# Patient Record
Sex: MALE | Race: WHITE | NOT HISPANIC OR LATINO | Employment: PART TIME | URBAN - METROPOLITAN AREA
[De-identification: names, ages, dates, MRNs, and addresses within clinical notes are randomized per-mention and may not be internally consistent; named-entity substitution may affect disease eponyms.]

---

## 2024-05-22 ENCOUNTER — HOSPITAL ENCOUNTER (OUTPATIENT)
Facility: HOSPITAL | Age: 29
Discharge: HOME | End: 2024-05-23
Attending: EMERGENCY MEDICINE | Admitting: STUDENT IN AN ORGANIZED HEALTH CARE EDUCATION/TRAINING PROGRAM
Payer: COMMERCIAL

## 2024-05-22 DIAGNOSIS — L02.31 GLUTEAL ABSCESS: Primary | ICD-10-CM

## 2024-05-22 DIAGNOSIS — L02.215 PERINEAL ABSCESS: ICD-10-CM

## 2024-05-22 LAB
ALBUMIN SERPL-MCNC: 4.1 G/DL (ref 3.5–5.2)
ALBUMIN/GLOB SERPL: 1.2 G/DL
ALP SERPL-CCNC: 57 U/L (ref 39–117)
ALT SERPL W P-5'-P-CCNC: 35 U/L (ref 1–41)
ANION GAP SERPL CALCULATED.3IONS-SCNC: 10 MMOL/L (ref 5–15)
AST SERPL-CCNC: 24 U/L (ref 1–40)
BASOPHILS # BLD AUTO: 0.03 10*3/MM3 (ref 0–0.2)
BASOPHILS NFR BLD AUTO: 0.3 % (ref 0–1.5)
BILIRUB SERPL-MCNC: 0.6 MG/DL (ref 0–1.2)
BUN SERPL-MCNC: 11 MG/DL (ref 6–20)
BUN/CREAT SERPL: 9.6 (ref 7–25)
CALCIUM SPEC-SCNC: 9.6 MG/DL (ref 8.6–10.5)
CHLORIDE SERPL-SCNC: 99 MMOL/L (ref 98–107)
CO2 SERPL-SCNC: 26 MMOL/L (ref 22–29)
CREAT SERPL-MCNC: 1.14 MG/DL (ref 0.76–1.27)
DEPRECATED RDW RBC AUTO: 40.9 FL (ref 37–54)
EGFRCR SERPLBLD CKD-EPI 2021: 91 ML/MIN/1.73
EOSINOPHIL # BLD AUTO: 0.11 10*3/MM3 (ref 0–0.4)
EOSINOPHIL NFR BLD AUTO: 0.9 % (ref 0.3–6.2)
ERYTHROCYTE [DISTWIDTH] IN BLOOD BY AUTOMATED COUNT: 12.4 % (ref 12.3–15.4)
GLOBULIN UR ELPH-MCNC: 3.4 GM/DL
GLUCOSE SERPL-MCNC: 99 MG/DL (ref 65–99)
HCT VFR BLD AUTO: 43.4 % (ref 37.5–51)
HGB BLD-MCNC: 14.4 G/DL (ref 13–17.7)
IMM GRANULOCYTES # BLD AUTO: 0.01 10*3/MM3 (ref 0–0.05)
IMM GRANULOCYTES NFR BLD AUTO: 0.1 % (ref 0–0.5)
LYMPHOCYTES # BLD AUTO: 1.88 10*3/MM3 (ref 0.7–3.1)
LYMPHOCYTES NFR BLD AUTO: 15.7 % (ref 19.6–45.3)
MCH RBC QN AUTO: 29.1 PG (ref 26.6–33)
MCHC RBC AUTO-ENTMCNC: 33.2 G/DL (ref 31.5–35.7)
MCV RBC AUTO: 87.9 FL (ref 79–97)
MONOCYTES # BLD AUTO: 1.25 10*3/MM3 (ref 0.1–0.9)
MONOCYTES NFR BLD AUTO: 10.4 % (ref 5–12)
NEUTROPHILS NFR BLD AUTO: 72.6 % (ref 42.7–76)
NEUTROPHILS NFR BLD AUTO: 8.72 10*3/MM3 (ref 1.7–7)
PLATELET # BLD AUTO: 248 10*3/MM3 (ref 140–450)
PMV BLD AUTO: 8.1 FL (ref 6–12)
POTASSIUM SERPL-SCNC: 3.9 MMOL/L (ref 3.5–5.2)
PROT SERPL-MCNC: 7.5 G/DL (ref 6–8.5)
RBC # BLD AUTO: 4.94 10*6/MM3 (ref 4.14–5.8)
SODIUM SERPL-SCNC: 135 MMOL/L (ref 136–145)
WBC NRBC COR # BLD AUTO: 12 10*3/MM3 (ref 3.4–10.8)

## 2024-05-22 PROCEDURE — 80053 COMPREHEN METABOLIC PANEL: CPT | Performed by: NURSE PRACTITIONER

## 2024-05-22 PROCEDURE — 85025 COMPLETE CBC W/AUTO DIFF WBC: CPT | Performed by: NURSE PRACTITIONER

## 2024-05-22 PROCEDURE — 99284 EMERGENCY DEPT VISIT MOD MDM: CPT | Performed by: NURSE PRACTITIONER

## 2024-05-22 PROCEDURE — G0378 HOSPITAL OBSERVATION PER HR: HCPCS

## 2024-05-22 PROCEDURE — 99285 EMERGENCY DEPT VISIT HI MDM: CPT

## 2024-05-22 PROCEDURE — 10081 I&D PILONIDAL CYST COMP: CPT | Performed by: NURSE PRACTITIONER

## 2024-05-22 RX ORDER — NITROGLYCERIN 0.4 MG/1
0.4 TABLET SUBLINGUAL
Status: DISCONTINUED | OUTPATIENT
Start: 2024-05-22 | End: 2024-05-23 | Stop reason: HOSPADM

## 2024-05-22 RX ORDER — SODIUM CHLORIDE 0.9 % (FLUSH) 0.9 %
10 SYRINGE (ML) INJECTION EVERY 12 HOURS SCHEDULED
Status: DISCONTINUED | OUTPATIENT
Start: 2024-05-23 | End: 2024-05-23 | Stop reason: HOSPADM

## 2024-05-22 RX ORDER — LIDOCAINE HYDROCHLORIDE AND EPINEPHRINE 10; 10 MG/ML; UG/ML
10 INJECTION, SOLUTION INFILTRATION; PERINEURAL ONCE
Status: COMPLETED | OUTPATIENT
Start: 2024-05-22 | End: 2024-05-22

## 2024-05-22 RX ORDER — VANCOMYCIN/0.9 % SOD CHLORIDE 1.5G/250ML
1500 PLASTIC BAG, INJECTION (ML) INTRAVENOUS EVERY 12 HOURS
Status: DISCONTINUED | OUTPATIENT
Start: 2024-05-23 | End: 2024-05-23 | Stop reason: HOSPADM

## 2024-05-22 RX ORDER — ACETAMINOPHEN 160 MG/5ML
650 SOLUTION ORAL EVERY 4 HOURS PRN
Status: DISCONTINUED | OUTPATIENT
Start: 2024-05-22 | End: 2024-05-23 | Stop reason: HOSPADM

## 2024-05-22 RX ORDER — SODIUM CHLORIDE 9 MG/ML
40 INJECTION, SOLUTION INTRAVENOUS AS NEEDED
Status: DISCONTINUED | OUTPATIENT
Start: 2024-05-22 | End: 2024-05-23 | Stop reason: HOSPADM

## 2024-05-22 RX ORDER — UREA 10 %
5 LOTION (ML) TOPICAL NIGHTLY PRN
Status: DISCONTINUED | OUTPATIENT
Start: 2024-05-22 | End: 2024-05-23 | Stop reason: HOSPADM

## 2024-05-22 RX ORDER — SODIUM CHLORIDE 0.9 % (FLUSH) 0.9 %
10 SYRINGE (ML) INJECTION AS NEEDED
Status: DISCONTINUED | OUTPATIENT
Start: 2024-05-22 | End: 2024-05-23 | Stop reason: HOSPADM

## 2024-05-22 RX ORDER — ONDANSETRON 2 MG/ML
4 INJECTION INTRAMUSCULAR; INTRAVENOUS EVERY 6 HOURS PRN
Status: DISCONTINUED | OUTPATIENT
Start: 2024-05-22 | End: 2024-05-23 | Stop reason: HOSPADM

## 2024-05-22 RX ORDER — BISACODYL 5 MG/1
5 TABLET, DELAYED RELEASE ORAL DAILY PRN
Status: DISCONTINUED | OUTPATIENT
Start: 2024-05-22 | End: 2024-05-23 | Stop reason: HOSPADM

## 2024-05-22 RX ORDER — AMOXICILLIN 250 MG
2 CAPSULE ORAL 2 TIMES DAILY PRN
Status: DISCONTINUED | OUTPATIENT
Start: 2024-05-22 | End: 2024-05-23 | Stop reason: HOSPADM

## 2024-05-22 RX ORDER — BISACODYL 10 MG
10 SUPPOSITORY, RECTAL RECTAL DAILY PRN
Status: DISCONTINUED | OUTPATIENT
Start: 2024-05-22 | End: 2024-05-23 | Stop reason: HOSPADM

## 2024-05-22 RX ORDER — VANCOMYCIN 2 GRAM/500 ML IN 0.9 % SODIUM CHLORIDE INTRAVENOUS
2000 ONCE
Status: COMPLETED | OUTPATIENT
Start: 2024-05-23 | End: 2024-05-23

## 2024-05-22 RX ORDER — ACETAMINOPHEN 650 MG/1
650 SUPPOSITORY RECTAL EVERY 4 HOURS PRN
Status: DISCONTINUED | OUTPATIENT
Start: 2024-05-22 | End: 2024-05-23 | Stop reason: HOSPADM

## 2024-05-22 RX ORDER — ONDANSETRON 4 MG/1
4 TABLET, ORALLY DISINTEGRATING ORAL EVERY 6 HOURS PRN
Status: DISCONTINUED | OUTPATIENT
Start: 2024-05-22 | End: 2024-05-23 | Stop reason: HOSPADM

## 2024-05-22 RX ORDER — ACETAMINOPHEN 325 MG/1
650 TABLET ORAL EVERY 4 HOURS PRN
Status: DISCONTINUED | OUTPATIENT
Start: 2024-05-22 | End: 2024-05-23 | Stop reason: HOSPADM

## 2024-05-22 RX ORDER — POLYETHYLENE GLYCOL 3350 17 G/17G
17 POWDER, FOR SOLUTION ORAL DAILY PRN
Status: DISCONTINUED | OUTPATIENT
Start: 2024-05-22 | End: 2024-05-23 | Stop reason: HOSPADM

## 2024-05-22 RX ADMIN — Medication 2 ML: at 18:36

## 2024-05-22 RX ADMIN — LIDOCAINE HYDROCHLORIDE AND EPINEPHRINE 10 ML: 10; 10 INJECTION, SOLUTION INFILTRATION; PERINEURAL at 19:09

## 2024-05-22 NOTE — FSED PROVIDER NOTE
EMERGENCY DEPARTMENT ENCOUNTER    Room Number:  10/10  Date seen:  5/22/2024  Time seen: 18:24 EDT  PCP: Provider, No Known  Historian: patient    Discussed/obtained information from independent historians: n/a    HPI:  Chief complaint:buttocks abscess  A complete HPI/ROS/PMH/PSH/SH/FH are unobtainable due to:   Context:Freddy Long is a 28 y.o. male who presents to the ED with c/o acute increase in pain and swelling to right medial gluteal abscess.  He was seen here 2 days ago for similar and had small incision and drainage, placed on Bactrim DS and encouraged to follow up General Surgery.  He has not made that appointment yet.  Denies fever/chills or problems passing bowel movements. Has had this problem before. He does have h/o pilonidal cyst drainage in 2019 that improved with general surgery incision and drainage.     ALLERGIES  Patient has no known allergies.    PAST MEDICAL HISTORY  Active Ambulatory Problems     Diagnosis Date Noted    No Active Ambulatory Problems     Resolved Ambulatory Problems     Diagnosis Date Noted    No Resolved Ambulatory Problems     No Additional Past Medical History       PAST SURGICAL HISTORY  History reviewed. No pertinent surgical history.    FAMILY HISTORY  History reviewed. No pertinent family history.    SOCIAL HISTORY  Social History     Socioeconomic History    Marital status:        REVIEW OF SYSTEMS  Review of Systems    All systems reviewed and negative except for those discussed in HPI.     PHYSICAL EXAM    I have reviewed the triage vital signs and nursing notes.  Vitals:    05/22/24 1819   BP: 150/82   Pulse: 83   Resp: 16   Temp: 99.1 °F (37.3 °C)   SpO2: 96%     Physical Exam  Constitutional:           Comments: Right medial gluteal abscess. Moderate fluctuance noted. I can see where the prior incision and drainage was performed.          GENERAL: not distressed  HENT: nares patent  EYES: no scleral icterus  NECK: no ROM limitations  CV: regular  rhythm, regular rate  RESPIRATORY: normal effort  ABDOMEN: soft  : see area above for gluteal abscess.  It does not involve the rectum.   MUSCULOSKELETAL: no deformity  NEURO: alert, moves all extremities, follows commands  SKIN: warm, dry  Incision & Drainage    Date/Time: 5/22/2024 7:10 PM    Performed by: Teresa Kramer APRN  Authorized by: Emigdio Jacobsen MD    Consent:     Consent obtained:  Verbal    Consent given by:  Patient    Risks, benefits, and alternatives were discussed: yes      Risks discussed:  Bleeding, incomplete drainage, pain and infection    Alternatives discussed:  No treatment  Universal protocol:     Patient identity confirmed:  Verbally with patient and arm band  Location:     Type:  Abscess    Location:  Anogenital    Anogenital location:  Pilonidal  Pre-procedure details:     Skin preparation:  Chlorhexidine  Sedation:     Sedation type:  None  Anesthesia:     Anesthesia method:  Topical application and local infiltration    Topical anesthetic:  LET    Local anesthetic:  Lidocaine 1% WITH epi  Procedure type:     Complexity:  Complex  Procedure details:     Ultrasound guidance: no      Needle aspiration: no      Incision types:  Stab incision    Incision depth:  Subcutaneous    Wound management:  Probed and deloculated and irrigated with saline    Drainage:  Bloody and purulent    Drainage amount:  Moderate    Wound treatment:  Wound left open    Packing materials:  None  Comments:      Probed to break up loculations,  the drainage was only effective if I pushed the upper most fluctuant area.  Pt seen here 2 days ago for similar and the abscess is much worse and more swollen.  He has low grade feer.     LAB RESULTS  Recent Results (from the past 24 hour(s))   CBC Auto Differential    Collection Time: 05/22/24  7:53 PM    Specimen: Blood   Result Value Ref Range    WBC 12.00 (H) 3.40 - 10.80 10*3/mm3    RBC 4.94 4.14 - 5.80 10*6/mm3    Hemoglobin 14.4 13.0 - 17.7 g/dL    Hematocrit  43.4 37.5 - 51.0 %    MCV 87.9 79.0 - 97.0 fL    MCH 29.1 26.6 - 33.0 pg    MCHC 33.2 31.5 - 35.7 g/dL    RDW 12.4 12.3 - 15.4 %    RDW-SD 40.9 37.0 - 54.0 fl    MPV 8.1 6.0 - 12.0 fL    Platelets 248 140 - 450 10*3/mm3    Neutrophil % 72.6 42.7 - 76.0 %    Lymphocyte % 15.7 (L) 19.6 - 45.3 %    Monocyte % 10.4 5.0 - 12.0 %    Eosinophil % 0.9 0.3 - 6.2 %    Basophil % 0.3 0.0 - 1.5 %    Immature Grans % 0.1 0.0 - 0.5 %    Neutrophils, Absolute 8.72 (H) 1.70 - 7.00 10*3/mm3    Lymphocytes, Absolute 1.88 0.70 - 3.10 10*3/mm3    Monocytes, Absolute 1.25 (H) 0.10 - 0.90 10*3/mm3    Eosinophils, Absolute 0.11 0.00 - 0.40 10*3/mm3    Basophils, Absolute 0.03 0.00 - 0.20 10*3/mm3    Immature Grans, Absolute 0.01 0.00 - 0.05 10*3/mm3       Ordered the above labs and independently interpreted results.  My findings will be discussed in the ED course or medical decision making section below    PROGRESS, DATA ANALYSIS, CONSULTS AND MEDICAL DECISION MAKING    Please note that this section constitutes my independent interpretation of clinical data including lab results, radiology, EKG's.  This constitutes my independent professional opinion regarding differential diagnosis and management of this patient.  It may include any factors such as history from outside sources, review of external records, social determinants of health, management of medications, response to those treatments, and discussions with other providers.    ED Course as of 05/22/24 2003   Wed May 22, 2024   1935 Discussed patient with Dr. Zambrano.  I discussed the pt's abscess.  He would like the patient admitted.    [EW]      ED Course User Index  [EW] Teresa Kramer APRN     Orders placed during this visit:  Orders Placed This Encounter   Procedures    Incision & Drainage    Comprehensive Metabolic Panel    CBC Auto Differential    Blood Draw With IV Start    Insert peripheral IV    Initiate Observation Status    CBC & Differential    ED Acknowledgement Form  Needed;            Medical Decision Making  Problems Addressed:  Gluteal abscess: complicated acute illness or injury    Amount and/or Complexity of Data Reviewed  Labs: ordered.    Risk  Prescription drug management.  Decision regarding hospitalization.    Pt returns for increasing pain, swelling and redness from a right gluteal almost pilonidal abscess.  Has h/o pilonidal abscess drainage in past.  This is more superior and gluteal than prior.  He has failed outpatient treatment with incision and drainage performed 2 days ago and compliance with Bactrim DS.  I did additional incision and drainage and was only able to get out minimal amount in relation to the degree and fluctuance noted on exam.  Additionally he has large area of surrounding cellulitis.  I have discussed the patient with on call General Surgery, Dr. Zambrano who agrees to admit and will take patient to operating room for more formal incision and exam. The area of abscess does not extend to anus.     DIAGNOSIS  Final diagnoses:   Gluteal abscess          Medication List      No changes were made to your prescriptions during this visit.       Admission    Latest Documented Vital Signs:  As of 20:03 EDT  BP- 150/82 HR- 83 Temp- 99.1 °F (37.3 °C) (Oral) O2 sat- 96%    Appropriate PPE utilized throughout this patient encounter to include mask, if indicated, per current protocol. Hand hygiene was performed before donning PPE and after removal when leaving the room.    Please note that portions of this were completed with a voice recognition program.     Note Disclaimer: At [facility], we believe that sharing information builds trust and better relationships. You are receiving this note because you are receiving care at [facility] or recently visited. It is possible you will see health information before a provider has talked with you about it. This kind of information can be easy to misunderstand. To help you fully understand what it means for your  health, we urge you to discuss this note with your provider.

## 2024-05-23 ENCOUNTER — ANESTHESIA (OUTPATIENT)
Dept: PERIOP | Facility: HOSPITAL | Age: 29
End: 2024-05-23
Payer: COMMERCIAL

## 2024-05-23 ENCOUNTER — ANESTHESIA EVENT (OUTPATIENT)
Dept: PERIOP | Facility: HOSPITAL | Age: 29
End: 2024-05-23
Payer: COMMERCIAL

## 2024-05-23 VITALS
HEART RATE: 83 BPM | BODY MASS INDEX: 36.18 KG/M2 | RESPIRATION RATE: 14 BRPM | TEMPERATURE: 97.9 F | DIASTOLIC BLOOD PRESSURE: 89 MMHG | OXYGEN SATURATION: 95 % | SYSTOLIC BLOOD PRESSURE: 135 MMHG | WEIGHT: 306.44 LBS | HEIGHT: 77 IN

## 2024-05-23 PROBLEM — L05.01 PILONIDAL CYST WITH ABSCESS: Status: ACTIVE | Noted: 2019-01-08

## 2024-05-23 PROBLEM — L05.01 PILONIDAL CYST WITH ABSCESS: Status: RESOLVED | Noted: 2019-01-08 | Resolved: 2024-05-23

## 2024-05-23 LAB
ANION GAP SERPL CALCULATED.3IONS-SCNC: 10 MMOL/L (ref 5–15)
BASOPHILS # BLD AUTO: 0.02 10*3/MM3 (ref 0–0.2)
BASOPHILS NFR BLD AUTO: 0.2 % (ref 0–1.5)
BUN SERPL-MCNC: 12 MG/DL (ref 6–20)
BUN/CREAT SERPL: 10.2 (ref 7–25)
CALCIUM SPEC-SCNC: 9.1 MG/DL (ref 8.6–10.5)
CHLORIDE SERPL-SCNC: 102 MMOL/L (ref 98–107)
CO2 SERPL-SCNC: 25 MMOL/L (ref 22–29)
CREAT SERPL-MCNC: 1.18 MG/DL (ref 0.76–1.27)
D-LACTATE SERPL-SCNC: 1.4 MMOL/L (ref 0.5–2)
DEPRECATED RDW RBC AUTO: 40 FL (ref 37–54)
EGFRCR SERPLBLD CKD-EPI 2021: 87.3 ML/MIN/1.73
EOSINOPHIL # BLD AUTO: 0.15 10*3/MM3 (ref 0–0.4)
EOSINOPHIL NFR BLD AUTO: 1.4 % (ref 0.3–6.2)
ERYTHROCYTE [DISTWIDTH] IN BLOOD BY AUTOMATED COUNT: 12.4 % (ref 12.3–15.4)
GLUCOSE SERPL-MCNC: 149 MG/DL (ref 65–99)
HCT VFR BLD AUTO: 39 % (ref 37.5–51)
HGB BLD-MCNC: 12.9 G/DL (ref 13–17.7)
IMM GRANULOCYTES # BLD AUTO: 0.03 10*3/MM3 (ref 0–0.05)
IMM GRANULOCYTES NFR BLD AUTO: 0.3 % (ref 0–0.5)
LYMPHOCYTES # BLD AUTO: 1.77 10*3/MM3 (ref 0.7–3.1)
LYMPHOCYTES NFR BLD AUTO: 17 % (ref 19.6–45.3)
MAGNESIUM SERPL-MCNC: 1.9 MG/DL (ref 1.6–2.6)
MCH RBC QN AUTO: 28.9 PG (ref 26.6–33)
MCHC RBC AUTO-ENTMCNC: 33.1 G/DL (ref 31.5–35.7)
MCV RBC AUTO: 87.2 FL (ref 79–97)
MONOCYTES # BLD AUTO: 0.85 10*3/MM3 (ref 0.1–0.9)
MONOCYTES NFR BLD AUTO: 8.2 % (ref 5–12)
NEUTROPHILS NFR BLD AUTO: 7.57 10*3/MM3 (ref 1.7–7)
NEUTROPHILS NFR BLD AUTO: 72.9 % (ref 42.7–76)
NRBC BLD AUTO-RTO: 0 /100 WBC (ref 0–0.2)
PLATELET # BLD AUTO: 235 10*3/MM3 (ref 140–450)
PMV BLD AUTO: 8.8 FL (ref 6–12)
POTASSIUM SERPL-SCNC: 4.1 MMOL/L (ref 3.5–5.2)
RBC # BLD AUTO: 4.47 10*6/MM3 (ref 4.14–5.8)
SODIUM SERPL-SCNC: 137 MMOL/L (ref 136–145)
WBC NRBC COR # BLD AUTO: 10.39 10*3/MM3 (ref 3.4–10.8)

## 2024-05-23 PROCEDURE — 83605 ASSAY OF LACTIC ACID: CPT | Performed by: STUDENT IN AN ORGANIZED HEALTH CARE EDUCATION/TRAINING PROGRAM

## 2024-05-23 PROCEDURE — 25010000002 CEFEPIME PER 500 MG: Performed by: STUDENT IN AN ORGANIZED HEALTH CARE EDUCATION/TRAINING PROGRAM

## 2024-05-23 PROCEDURE — G0378 HOSPITAL OBSERVATION PER HR: HCPCS

## 2024-05-23 PROCEDURE — 96366 THER/PROPH/DIAG IV INF ADDON: CPT

## 2024-05-23 PROCEDURE — 25810000003 LACTATED RINGERS PER 1000 ML: Performed by: ANESTHESIOLOGY

## 2024-05-23 PROCEDURE — 25010000002 PROPOFOL 200 MG/20ML EMULSION: Performed by: NURSE ANESTHETIST, CERTIFIED REGISTERED

## 2024-05-23 PROCEDURE — 87205 SMEAR GRAM STAIN: CPT | Performed by: STUDENT IN AN ORGANIZED HEALTH CARE EDUCATION/TRAINING PROGRAM

## 2024-05-23 PROCEDURE — 25010000002 SUGAMMADEX 200 MG/2ML SOLUTION: Performed by: NURSE ANESTHETIST, CERTIFIED REGISTERED

## 2024-05-23 PROCEDURE — 10080 I&D PILONIDAL CYST SIMPLE: CPT | Performed by: STUDENT IN AN ORGANIZED HEALTH CARE EDUCATION/TRAINING PROGRAM

## 2024-05-23 PROCEDURE — 85025 COMPLETE CBC W/AUTO DIFF WBC: CPT | Performed by: STUDENT IN AN ORGANIZED HEALTH CARE EDUCATION/TRAINING PROGRAM

## 2024-05-23 PROCEDURE — 87186 SC STD MICRODIL/AGAR DIL: CPT | Performed by: STUDENT IN AN ORGANIZED HEALTH CARE EDUCATION/TRAINING PROGRAM

## 2024-05-23 PROCEDURE — 87040 BLOOD CULTURE FOR BACTERIA: CPT | Performed by: STUDENT IN AN ORGANIZED HEALTH CARE EDUCATION/TRAINING PROGRAM

## 2024-05-23 PROCEDURE — 80048 BASIC METABOLIC PNL TOTAL CA: CPT | Performed by: STUDENT IN AN ORGANIZED HEALTH CARE EDUCATION/TRAINING PROGRAM

## 2024-05-23 PROCEDURE — 83735 ASSAY OF MAGNESIUM: CPT | Performed by: STUDENT IN AN ORGANIZED HEALTH CARE EDUCATION/TRAINING PROGRAM

## 2024-05-23 PROCEDURE — 99221 1ST HOSP IP/OBS SF/LOW 40: CPT | Performed by: STUDENT IN AN ORGANIZED HEALTH CARE EDUCATION/TRAINING PROGRAM

## 2024-05-23 PROCEDURE — 87075 CULTR BACTERIA EXCEPT BLOOD: CPT | Performed by: STUDENT IN AN ORGANIZED HEALTH CARE EDUCATION/TRAINING PROGRAM

## 2024-05-23 PROCEDURE — 25010000002 MIDAZOLAM PER 1 MG: Performed by: NURSE ANESTHETIST, CERTIFIED REGISTERED

## 2024-05-23 PROCEDURE — 25010000002 BUPIVACAINE (PF) 0.25 % SOLUTION: Performed by: STUDENT IN AN ORGANIZED HEALTH CARE EDUCATION/TRAINING PROGRAM

## 2024-05-23 PROCEDURE — 87070 CULTURE OTHR SPECIMN AEROBIC: CPT | Performed by: STUDENT IN AN ORGANIZED HEALTH CARE EDUCATION/TRAINING PROGRAM

## 2024-05-23 PROCEDURE — 25010000002 HYDROMORPHONE 1 MG/ML SOLUTION: Performed by: NURSE ANESTHETIST, CERTIFIED REGISTERED

## 2024-05-23 PROCEDURE — 25010000002 VANCOMYCIN HCL IN NACL 2-0.9 GM/500ML-% SOLUTION: Performed by: STUDENT IN AN ORGANIZED HEALTH CARE EDUCATION/TRAINING PROGRAM

## 2024-05-23 PROCEDURE — 25010000002 DEXAMETHASONE PER 1 MG: Performed by: NURSE ANESTHETIST, CERTIFIED REGISTERED

## 2024-05-23 PROCEDURE — 87076 CULTURE ANAEROBE IDENT EACH: CPT | Performed by: STUDENT IN AN ORGANIZED HEALTH CARE EDUCATION/TRAINING PROGRAM

## 2024-05-23 PROCEDURE — 96367 TX/PROPH/DG ADDL SEQ IV INF: CPT

## 2024-05-23 PROCEDURE — 87077 CULTURE AEROBIC IDENTIFY: CPT | Performed by: STUDENT IN AN ORGANIZED HEALTH CARE EDUCATION/TRAINING PROGRAM

## 2024-05-23 PROCEDURE — 25010000002 SUCCINYLCHOLINE PER 20 MG: Performed by: NURSE ANESTHETIST, CERTIFIED REGISTERED

## 2024-05-23 PROCEDURE — 25010000002 ONDANSETRON PER 1 MG: Performed by: NURSE ANESTHETIST, CERTIFIED REGISTERED

## 2024-05-23 PROCEDURE — 25010000002 FENTANYL CITRATE (PF) 100 MCG/2ML SOLUTION: Performed by: NURSE ANESTHETIST, CERTIFIED REGISTERED

## 2024-05-23 PROCEDURE — 96365 THER/PROPH/DIAG IV INF INIT: CPT

## 2024-05-23 RX ORDER — DIPHENHYDRAMINE HYDROCHLORIDE 50 MG/ML
12.5 INJECTION INTRAMUSCULAR; INTRAVENOUS
Status: DISCONTINUED | OUTPATIENT
Start: 2024-05-23 | End: 2024-05-23 | Stop reason: HOSPADM

## 2024-05-23 RX ORDER — FLUMAZENIL 0.1 MG/ML
0.2 INJECTION INTRAVENOUS AS NEEDED
Status: DISCONTINUED | OUTPATIENT
Start: 2024-05-23 | End: 2024-05-23 | Stop reason: HOSPADM

## 2024-05-23 RX ORDER — MIDAZOLAM HYDROCHLORIDE 1 MG/ML
INJECTION INTRAMUSCULAR; INTRAVENOUS AS NEEDED
Status: DISCONTINUED | OUTPATIENT
Start: 2024-05-23 | End: 2024-05-23 | Stop reason: SURG

## 2024-05-23 RX ORDER — PROPOFOL 10 MG/ML
INJECTION, EMULSION INTRAVENOUS AS NEEDED
Status: DISCONTINUED | OUTPATIENT
Start: 2024-05-23 | End: 2024-05-23 | Stop reason: SURG

## 2024-05-23 RX ORDER — PROMETHAZINE HYDROCHLORIDE 25 MG/1
25 SUPPOSITORY RECTAL ONCE AS NEEDED
Status: DISCONTINUED | OUTPATIENT
Start: 2024-05-23 | End: 2024-05-23 | Stop reason: HOSPADM

## 2024-05-23 RX ORDER — BUPIVACAINE HYDROCHLORIDE 2.5 MG/ML
INJECTION, SOLUTION EPIDURAL; INFILTRATION; INTRACAUDAL AS NEEDED
Status: DISCONTINUED | OUTPATIENT
Start: 2024-05-23 | End: 2024-05-23 | Stop reason: HOSPADM

## 2024-05-23 RX ORDER — SODIUM CHLORIDE 0.9 % (FLUSH) 0.9 %
10 SYRINGE (ML) INJECTION AS NEEDED
Status: DISCONTINUED | OUTPATIENT
Start: 2024-05-23 | End: 2024-05-23 | Stop reason: HOSPADM

## 2024-05-23 RX ORDER — HYDROCODONE BITARTRATE AND ACETAMINOPHEN 5; 325 MG/1; MG/1
1 TABLET ORAL EVERY 4 HOURS PRN
Qty: 6 TABLET | Refills: 0 | Status: SHIPPED | OUTPATIENT
Start: 2024-05-23 | End: 2024-05-26

## 2024-05-23 RX ORDER — SODIUM CHLORIDE, SODIUM LACTATE, POTASSIUM CHLORIDE, CALCIUM CHLORIDE 600; 310; 30; 20 MG/100ML; MG/100ML; MG/100ML; MG/100ML
1000 INJECTION, SOLUTION INTRAVENOUS CONTINUOUS
Status: DISCONTINUED | OUTPATIENT
Start: 2024-05-23 | End: 2024-05-23

## 2024-05-23 RX ORDER — SUCCINYLCHOLINE CHLORIDE 20 MG/ML
INJECTION INTRAMUSCULAR; INTRAVENOUS AS NEEDED
Status: DISCONTINUED | OUTPATIENT
Start: 2024-05-23 | End: 2024-05-23 | Stop reason: SURG

## 2024-05-23 RX ORDER — ONDANSETRON 2 MG/ML
4 INJECTION INTRAMUSCULAR; INTRAVENOUS ONCE AS NEEDED
Status: DISCONTINUED | OUTPATIENT
Start: 2024-05-23 | End: 2024-05-23 | Stop reason: HOSPADM

## 2024-05-23 RX ORDER — ONDANSETRON 2 MG/ML
INJECTION INTRAMUSCULAR; INTRAVENOUS AS NEEDED
Status: DISCONTINUED | OUTPATIENT
Start: 2024-05-23 | End: 2024-05-23 | Stop reason: SURG

## 2024-05-23 RX ORDER — DEXMEDETOMIDINE HYDROCHLORIDE 100 UG/ML
INJECTION, SOLUTION INTRAVENOUS AS NEEDED
Status: DISCONTINUED | OUTPATIENT
Start: 2024-05-23 | End: 2024-05-23 | Stop reason: SURG

## 2024-05-23 RX ORDER — LIDOCAINE HYDROCHLORIDE 20 MG/ML
INJECTION, SOLUTION EPIDURAL; INFILTRATION; INTRACAUDAL; PERINEURAL AS NEEDED
Status: DISCONTINUED | OUTPATIENT
Start: 2024-05-23 | End: 2024-05-23 | Stop reason: SURG

## 2024-05-23 RX ORDER — IPRATROPIUM BROMIDE AND ALBUTEROL SULFATE 2.5; .5 MG/3ML; MG/3ML
3 SOLUTION RESPIRATORY (INHALATION) ONCE AS NEEDED
Status: DISCONTINUED | OUTPATIENT
Start: 2024-05-23 | End: 2024-05-23 | Stop reason: HOSPADM

## 2024-05-23 RX ORDER — HYDRALAZINE HYDROCHLORIDE 20 MG/ML
5 INJECTION INTRAMUSCULAR; INTRAVENOUS
Status: DISCONTINUED | OUTPATIENT
Start: 2024-05-23 | End: 2024-05-23 | Stop reason: HOSPADM

## 2024-05-23 RX ORDER — DROPERIDOL 2.5 MG/ML
0.62 INJECTION, SOLUTION INTRAMUSCULAR; INTRAVENOUS
Status: DISCONTINUED | OUTPATIENT
Start: 2024-05-23 | End: 2024-05-23 | Stop reason: HOSPADM

## 2024-05-23 RX ORDER — NALOXONE HCL 0.4 MG/ML
0.2 VIAL (ML) INJECTION AS NEEDED
Status: DISCONTINUED | OUTPATIENT
Start: 2024-05-23 | End: 2024-05-23 | Stop reason: HOSPADM

## 2024-05-23 RX ORDER — HYDROCODONE BITARTRATE AND ACETAMINOPHEN 5; 325 MG/1; MG/1
1 TABLET ORAL EVERY 4 HOURS PRN
Status: DISCONTINUED | OUTPATIENT
Start: 2024-05-23 | End: 2024-05-23 | Stop reason: HOSPADM

## 2024-05-23 RX ORDER — LABETALOL HYDROCHLORIDE 5 MG/ML
5 INJECTION, SOLUTION INTRAVENOUS
Status: DISCONTINUED | OUTPATIENT
Start: 2024-05-23 | End: 2024-05-23 | Stop reason: HOSPADM

## 2024-05-23 RX ORDER — FENTANYL CITRATE 50 UG/ML
INJECTION, SOLUTION INTRAMUSCULAR; INTRAVENOUS AS NEEDED
Status: DISCONTINUED | OUTPATIENT
Start: 2024-05-23 | End: 2024-05-23 | Stop reason: SURG

## 2024-05-23 RX ORDER — FENTANYL CITRATE 50 UG/ML
50 INJECTION, SOLUTION INTRAMUSCULAR; INTRAVENOUS
Status: DISCONTINUED | OUTPATIENT
Start: 2024-05-23 | End: 2024-05-23 | Stop reason: HOSPADM

## 2024-05-23 RX ORDER — PROMETHAZINE HYDROCHLORIDE 25 MG/1
25 TABLET ORAL ONCE AS NEEDED
Status: DISCONTINUED | OUTPATIENT
Start: 2024-05-23 | End: 2024-05-23 | Stop reason: HOSPADM

## 2024-05-23 RX ORDER — ROCURONIUM BROMIDE 10 MG/ML
INJECTION, SOLUTION INTRAVENOUS AS NEEDED
Status: DISCONTINUED | OUTPATIENT
Start: 2024-05-23 | End: 2024-05-23 | Stop reason: SURG

## 2024-05-23 RX ORDER — HYDROCODONE BITARTRATE AND ACETAMINOPHEN 5; 325 MG/1; MG/1
1 TABLET ORAL ONCE AS NEEDED
Status: DISCONTINUED | OUTPATIENT
Start: 2024-05-23 | End: 2024-05-23 | Stop reason: HOSPADM

## 2024-05-23 RX ORDER — DEXAMETHASONE SODIUM PHOSPHATE 4 MG/ML
INJECTION, SOLUTION INTRA-ARTICULAR; INTRALESIONAL; INTRAMUSCULAR; INTRAVENOUS; SOFT TISSUE AS NEEDED
Status: DISCONTINUED | OUTPATIENT
Start: 2024-05-23 | End: 2024-05-23 | Stop reason: SURG

## 2024-05-23 RX ADMIN — LIDOCAINE HYDROCHLORIDE 100 MG: 20 INJECTION, SOLUTION EPIDURAL; INFILTRATION; INTRACAUDAL; PERINEURAL at 08:17

## 2024-05-23 RX ADMIN — ROCURONIUM BROMIDE 50 MG: 10 INJECTION, SOLUTION INTRAVENOUS at 08:19

## 2024-05-23 RX ADMIN — DEXMEDETOMIDINE HYDROCHLORIDE 4 MCG: 100 INJECTION, SOLUTION INTRAVENOUS at 08:31

## 2024-05-23 RX ADMIN — CEFEPIME 2000 MG: 2 INJECTION, POWDER, FOR SOLUTION INTRAVENOUS at 01:55

## 2024-05-23 RX ADMIN — CEFEPIME 2 G: 2 INJECTION, POWDER, FOR SOLUTION INTRAVENOUS at 08:30

## 2024-05-23 RX ADMIN — Medication 10 ML: at 01:57

## 2024-05-23 RX ADMIN — DEXMEDETOMIDINE HYDROCHLORIDE 4 MCG: 100 INJECTION, SOLUTION INTRAVENOUS at 08:37

## 2024-05-23 RX ADMIN — HYDROMORPHONE HYDROCHLORIDE 1 MG: 1 INJECTION, SOLUTION INTRAMUSCULAR; INTRAVENOUS; SUBCUTANEOUS at 08:48

## 2024-05-23 RX ADMIN — PROPOFOL 200 MG: 10 INJECTION, EMULSION INTRAVENOUS at 08:17

## 2024-05-23 RX ADMIN — SUCCINYLCHOLINE CHLORIDE 120 MG: 20 INJECTION, SOLUTION INTRAMUSCULAR; INTRAVENOUS at 08:17

## 2024-05-23 RX ADMIN — ONDANSETRON 4 MG: 2 INJECTION INTRAMUSCULAR; INTRAVENOUS at 08:24

## 2024-05-23 RX ADMIN — DEXMEDETOMIDINE HYDROCHLORIDE 4 MCG: 100 INJECTION, SOLUTION INTRAVENOUS at 08:28

## 2024-05-23 RX ADMIN — SODIUM CHLORIDE, POTASSIUM CHLORIDE, SODIUM LACTATE AND CALCIUM CHLORIDE 1000 ML: 600; 310; 30; 20 INJECTION, SOLUTION INTRAVENOUS at 07:35

## 2024-05-23 RX ADMIN — FENTANYL CITRATE 100 MCG: 50 INJECTION, SOLUTION INTRAMUSCULAR; INTRAVENOUS at 08:15

## 2024-05-23 RX ADMIN — MIDAZOLAM 2 MG: 1 INJECTION INTRAMUSCULAR; INTRAVENOUS at 08:11

## 2024-05-23 RX ADMIN — SUGAMMADEX 400 MG: 100 INJECTION, SOLUTION INTRAVENOUS at 08:48

## 2024-05-23 RX ADMIN — DEXMEDETOMIDINE HYDROCHLORIDE 4 MCG: 100 INJECTION, SOLUTION INTRAVENOUS at 08:25

## 2024-05-23 RX ADMIN — DEXMEDETOMIDINE HYDROCHLORIDE 4 MCG: 100 INJECTION, SOLUTION INTRAVENOUS at 08:34

## 2024-05-23 RX ADMIN — DEXAMETHASONE SODIUM PHOSPHATE 4 MG: 4 INJECTION, SOLUTION INTRAMUSCULAR; INTRAVENOUS at 08:25

## 2024-05-23 RX ADMIN — Medication 2000 MG: at 02:47

## 2024-05-23 NOTE — ANESTHESIA PROCEDURE NOTES
Airway  Urgency: elective    Date/Time: 5/23/2024 8:18 AM  Airway not difficult    General Information and Staff    Patient location during procedure: OR  CRNA/CAA: Spring Ureña CRNA    Indications and Patient Condition  Indications for airway management: airway protection    Preoxygenated: yes  Mask difficulty assessment: 2 - vent by mask + OA or adjuvant +/- NMBA    Final Airway Details  Final airway type: endotracheal airway      Successful airway: ETT  Cuffed: yes   Successful intubation technique: video laryngoscopy  Facilitating devices/methods: intubating stylet  Endotracheal tube insertion site: oral  Blade: [place]  Blade size: 4  ETT size (mm): 7.5  Cormack-Lehane Classification: grade I - full view of glottis  Placement verified by: chest auscultation and capnometry   Cuff volume (mL): 8  Measured from: lips  ETT/EBT  to lips (cm): 21  Number of attempts at approach: 1    Additional Comments  Atraumatic placement of ETT, dentition unchanged from preop.

## 2024-05-23 NOTE — CASE MANAGEMENT/SOCIAL WORK
Discharge Planning Assessment  [facility]     Patient Name: Freddy Long  MRN: [id number]  Today's Date: 5/23/2024    Admit Date: 5/22/2024    Plan: Patient dc home before CM assessment.  No CM needs.   Discharge Needs Assessment    No documentation.                  Discharge Plan       Row Name 05/23/24 1257       Plan    Plan Patient dc home before CM assessment.  No CM needs.    Plan Comments Patient dc home before CM assessment.  No CM needs.    Final Discharge Disposition Code 01 - home or self-care    Final Note Home                  Continued Care and Services - Discharged on 5/23/2024 Admission date: 5/22/2024 - Discharge disposition: Home or Self Care   No active coordination exists for this encounter.       Expected Discharge Date and Time       Expected Discharge Date Expected Discharge Time    May 23, 2024         MORENO Rojo RN  SIPS/ICU   O: [phone]  C: [phone]  [email or web address]

## 2024-05-23 NOTE — CASE MANAGEMENT/SOCIAL WORK
Case Management Discharge Note      Final Note: Home         Selected Continued Care - Discharged on 5/23/2024 Admission date: 5/22/2024 - Discharge disposition: Home or Self Care       Transportation Services  Private: Car    Final Discharge Disposition Code: 01 - home or self-care    MORENO Rojo RN  SIPS/ICU   O: [phone]  C: [phone]  [email or web address]

## 2024-05-23 NOTE — DISCHARGE SUMMARY
Hospitalist Service   DISCHARGE SUMMARY    Patient Name: Freddy Long  : 1995  MRN: [id number]    Date of Admission: 2024  Date of Discharge:  24    Primary Care Physician: Provider, No Known      Presenting Problem:   Gluteal abscess [L02.31]    Active and Resolved Hospital Problems:  Active Hospital Problems    Diagnosis POA    **Gluteal abscess [L02.31] Yes      Resolved Hospital Problems   No resolved problems to display.         Hospital Course     Hospital Course:  Freddy Long is a 28 y.o. male with no significant past medical or surgical history who presents with pilonidal abscess.  This is his third abscess last was a couple years ago.  Required drainage in the past never been taken to the operating room for drainage never had of cystectomy.  This was attempted to be drained twice in the emergency department has been on Bactrim with worsening fluctuance and erythema.  General surgery was consulted and patient underwent pilonidal cystectomy on .  Overall doing well, okay to discharge per general surgery with outpatient follow-up in 2 weeks.  Resume home Bactrim to finish up the antibiotic course.  Patient is medically stable to discharge home with follow-up with general surgery as an outpatient.           DISCHARGE Follow Up Recommendations for labs and diagnostics:   Follow-up with general surgery    Reasons For Change In Medications and Indications for New Medications:  Continue Bactrim    Day of Discharge     Vital Signs:  Temp:  [97.9 °F (36.6 °C)-100.3 °F (37.9 °C)] 97.9 °F (36.6 °C)  Heart Rate:  [75-95] 83  Resp:  [10-20] 14  BP: (113-150)/(55-89) 135/89  Flow (L/min):  [6] 6    Physical Exam:  General: Awake, alert, NAD  Eyes: PERRL, EOMI, conjunctivae are clear  Cardiovascular: Regular rate and rhythm, no murmurs  Respiratory: Clear to auscultation bilaterally, no wheezing or rales, unlabored breathing  Abdomen: Soft, nontender, positive bowel sounds, no  guarding  Neurologic: A&O, CN grossly intact, moves all extremities spontaneously  Musculoskeletal: Normal range of motion, no other gross deformities  Skin: Warm, gluteal incision bandaged        Pertinent  and/or Most Recent Results     LAB RESULTS:      Lab 05/23/24 0105 05/22/24 1953   WBC 10.39 12.00*   HEMOGLOBIN 12.9* 14.4   HEMATOCRIT 39.0 43.4   PLATELETS 235 248   NEUTROS ABS 7.57* 8.72*   IMMATURE GRANS (ABS) 0.03 0.01   LYMPHS ABS 1.77 1.88   MONOS ABS 0.85 1.25*   EOS ABS 0.15 0.11   MCV 87.2 87.9   LACTATE 1.4  --          Lab 05/23/24  0105 05/22/24 1953   SODIUM 137 135*   POTASSIUM 4.1 3.9   CHLORIDE 102 99   CO2 25.0 26.0   ANION GAP 10.0 10.0   BUN 12 11   CREATININE 1.18 1.14   EGFR 87.3 91.0   GLUCOSE 149* 99   CALCIUM 9.1 9.6   MAGNESIUM 1.9  --          Lab 05/22/24 1953   TOTAL PROTEIN 7.5   ALBUMIN 4.1   GLOBULIN 3.4   ALT (SGPT) 35   AST (SGOT) 24   BILIRUBIN 0.6   ALK PHOS 57                     Brief Urine Lab Results       None          Microbiology Results (last 10 days)       Procedure Component Value - Date/Time    Wound Culture - Wound, Perineum [[id number]] Collected: 05/23/24 0843    Lab Status: Preliminary result Specimen: Wound from Perineum Updated: 05/23/24 1055     Gram Stain Moderate (3+) WBCs seen      Many (4+) Gram positive cocci in pairs and chains      Rare (1+) Gram positive cocci in clusters      Rare (1+) Gram negative bacilli    Wound Culture - Swab, Buttock, Right [[id number]] Collected: 05/20/24 1845    Lab Status: Preliminary result Specimen: Swab from Buttock, Right Updated: 05/23/24 0752     Wound Culture Scant growth (1+) Normal Skin Alycia     Gram Stain Rare (1+) WBCs per low power field      No organisms seen                             Labs Pending at Discharge:  Pending Labs       Order Current Status    Anaerobic Culture - Wound, Perineum In process    Blood Culture - Blood, Arm, Left In process    Blood Culture - Blood, Arm, Right In process    Wound  Culture - Wound, Perineum Preliminary result            Procedures Performed  Procedure(s):  INCISION AND DRAINAGE OF PILONIDAL ABSCESS - PRONE         Consults:   Consults       Date and Time Order Name Status Description    5/23/2024  3:34 AM Inpatient General Surgery Consult Completed               Discharge Details        Discharge Medications        New Medications        Instructions Start Date   HYDROcodone-acetaminophen 5-325 MG per tablet  Commonly known as: NORCO   1 tablet, Oral, Every 4 Hours PRN             Continue These Medications        Instructions Start Date   sulfamethoxazole-trimethoprim 800-160 MG per tablet  Commonly known as: BACTRIM DS,SEPTRA DS   1 tablet, Oral, 2 Times Daily               No Known Allergies      Discharge Disposition:  Home or Self Care    Diet:  Hospital:  Diet Order   Procedures    Diet: Regular/House; Fluid Consistency: Thin (IDDSI 0)         Discharge Activity:         CODE STATUS:  Code Status and Medical Interventions:   Ordered at: 05/22/24 3385     Code Status (Patient has no pulse and is not breathing):    CPR (Attempt to Resuscitate)     Medical Interventions (Patient has pulse or is breathing):    Full Support         Future Appointments   Date Time Provider Department Center   6/4/2024  8:45 AM Morris Zambrano MD [facility]           Time spent on Discharge including face to face service:  33 minutes    Signature:    Electronically signed by Ericka Ibrahim DO, 05/23/24, 10:33 AM EDT.      Part of this note may be an electronic transcription/translation of spoken language to printed text using the Dragon Dictation System.

## 2024-05-23 NOTE — ANESTHESIA POSTPROCEDURE EVALUATION
Patient: Freddy Long    Procedure Summary       Date: 05/23/24 Room / Location: [facility] OR 08 / [facility] MAIN OR    Anesthesia Start: 0811 Anesthesia Stop: 0859    Procedure: INCISION AND DRAINAGE OF PILONIDAL ABSCESS - PRONE Diagnosis:     Surgeons: Morris Zambrano MD Provider: Roosevelt Dwyer MD    Anesthesia Type: general ASA Status: 2            Anesthesia Type: general    Vitals  Vitals Value Taken Time   /64 05/23/24 0935   Temp 100.3 °F (37.9 °C) 05/23/24 0903   Pulse 85 05/23/24 0939   Resp 15 05/23/24 0933   SpO2 93 % 05/23/24 0939   Vitals shown include unfiled device data.        Post Anesthesia Care and Evaluation    Patient location during evaluation: PACU  Patient participation: complete - patient participated  Level of consciousness: awake  Pain scale: See nurse's notes for pain score.  Pain management: adequate    Airway patency: patent  Anesthetic complications: No anesthetic complications  PONV Status: none  Cardiovascular status: acceptable  Respiratory status: acceptable and spontaneous ventilation  Hydration status: acceptable    Comments: Patient seen and examined postoperatively; vital signs stable; SpO2 greater than or equal to 90%; cardiopulmonary status stable; nausea/vomiting adequately controlled; pain adequately controlled; no apparent anesthesia complications; patient discharged from anesthesia care when discharge criteria were met

## 2024-05-23 NOTE — PLAN OF CARE
Goal Outcome Evaluation:      Patient's admitted from free standing ED for gluteal abscess, coccyx area is noted to red. Dressing was taken down and a new one was reapplied. Patient's NPO. Alert and oriented x 4. Plan of care has been initiated. Heart and oxygen monitor in place and appears to be on sinus rhythm. SCDs are on. Call light within reach.

## 2024-05-23 NOTE — ED NOTES
"Nursing report ED to floor  Freddy Long  28 y.o.  male    HPI :  HPI (Adult)  Stated Reason for Visit: abcess to [facility] since last friday.  History Obtained From: patient  Precipitating Event(s): none  Onset of Symptoms: gradual  Duration (Weeks): 1    Chief Complaint  Chief Complaint   Patient presents with    Abscess     Abscess to lower back       Admitting doctor:   Dev Perez DO    Admitting diagnosis:   The encounter diagnosis was Gluteal abscess.    Code status:   Current Code Status       Date Active Code Status Order ID Comments User Context       Not on file            Allergies:   Patient has no known allergies.    Isolation:   No active isolations    Intake and Output  No intake or output data in the 24 hours ending 05/22/24 2045    Weight:       05/22/24 1819   Weight: (!) 140 kg (309 lb)       Most recent vitals:   Vitals:    05/22/24 1819   BP: 150/82   BP Location: Left arm   Patient Position: Sitting   Pulse: 83   Resp: 16   Temp: 99.1 °F (37.3 °C)   TempSrc: Oral   SpO2: 96%   Weight: (!) 140 kg (309 lb)   Height: 195.6 cm (77\")       Active LDAs/IV Access:   Lines, Drains & Airways       Active LDAs       Name Placement date Placement time Site Days    Peripheral IV 05/22/24 1953 Right Antecubital 05/22/24 1953  Antecubital  less than 1                    Labs (abnormal labs have a star):   Labs Reviewed   COMPREHENSIVE METABOLIC PANEL - Abnormal; Notable for the following components:       Result Value    Sodium 135 (*)     All other components within normal limits    Narrative:     GFR Normal >60  Chronic Kidney Disease <60  Kidney Failure <15     CBC WITH AUTO DIFFERENTIAL - Abnormal; Notable for the following components:    WBC 12.00 (*)     Lymphocyte % 15.7 (*)     Neutrophils, Absolute 8.72 (*)     Monocytes, Absolute 1.25 (*)     All other components within normal limits   CBC AND DIFFERENTIAL    Narrative:     The following orders were created for panel order CBC & " Differential.  Procedure                               Abnormality         Status                     ---------                               -----------         ------                     CBC Auto Differential[[id number]]        Abnormal            Final result                 Please view results for these tests on the individual orders.       EKG:   No orders to display       Meds given in ED:   Medications   sodium chloride 0.9 % flush 10 mL (has no administration in time range)   Lido-EPINEPHrine-Tetracaine 4-0.05-0.5 % gel 2 mL (2 mL Topical Given 5/22/24 5276)   lidocaine 1% - EPINEPHrine 1:340751 (XYLOCAINE W/EPI) 1 %-1:655715 injection 10 mL (10 mL Injection Given 5/22/24 0664)       Imaging results:  No radiology results for the last day    Ambulatory status:   - ambulatory/steady gait     Social issues:   Social History     Socioeconomic History    Marital status:        Peripheral Neurovascular  Peripheral Neurovascular (Adult)  Peripheral Neurovascular WDL: WDL    Neuro Cognitive  Neuro Cognitive (Adult)  Cognitive/Neuro/Behavioral WDL: WDL    Learning  Learning Assessment (Adult)  Learning Readiness and Ability: no barriers identified    Respiratory  Respiratory WDL  Respiratory WDL: WDL    Abdominal Pain       Pain Assessments  Pain (Adult)  (0-10) Pain Rating: Rest: 3  (0-10) Pain Rating: Activity: 3  Pain Location: rectal  Pain Side/Orientation: bilateral  Pain Description: intermittent    NIH Stroke Scale   N/a    Gwendolyn Call RN  05/22/24 20:45 EDT

## 2024-05-23 NOTE — OP NOTE
Operative Report:    Patient Name:  Freddy Long  YOB: 1995    Date of Surgery:  5/23/2024     Indications:    28-year-old gentleman with no significant past medical or surgical history who presents with pilonidal abscess.  This is his third abscess last was a couple years ago.  Required drainage in the past never been taken to the operating room for drainage never had of cystectomy.  This was attempted to be drained twice in the emergency department has been on Bactrim with worsening fluctuance and erythema.  Plan incision and drainage in the operating room, can likely be discharged later today.  Already has a prescription for antibiotics which he needs to finish.  Can remove packing in 48 hours. Can return to work without restrictions on Monday. All this was discussed with patient.  He can follow-up me in the office in 2 weeks for wound check and discuss pilonidal cystectomy.     Pre-op Diagnosis:   Pilonidal abscess       Post-Op Diagnosis Codes:  Same    Procedure/CPT® Codes:      Procedure(s):  PILONIDAL CYSTECTOMY- PRONE    Staff:  Surgeon(s):  Morris Zambrano MD    Circulator: Romero Rogers RN  Scrub Person: Nelly Perez      Anesthesia: General    Estimated Blood Loss: minimal    Implants:    Nothing was implanted during the procedure    Specimen:          Specimens       ID Source Type Tests Collected By Collected At Frozen?    1 Perineum Wound ANAEROBIC CULTURE  WOUND CULTURE   Morris Zambrano MD 5/23/24 0843     Description: perineal wound fluid for culture                Findings: 10 x 10 cm abscess cavity over the pilonidal region and about 100 cc of purulent drainage    Complications: None    Description of Procedure:   After risk benefits and alternatives were discussed patient informed sent was obtained.  Transported the operating room and underwent general anesthesia and was placed prone on the operating room table.  Pilonidal region was prepped and draped in sterile fashion  surgical timeout completed.  I inserted a hemostat through the previous I&D site from the emergency department and spread this to enter the abscess cavity.  I encountered about 100 cc of purulent drainage.  I sent a swab of this for culture.  I made a circular incision around this area with the Bovie.  I used my finger to break up loculations.  I irrigated the cavity copiously with saline.  I packed the cavity with half-inch iodoform gauze.  4 x 4's and ABD were placed over the incision site.  Patient was woken for general anesthesia transported recovery unit without incident.      Morris Zambrano MD     Date: 5/23/2024  Time: 08:54 EDT    This note was created using Dragon Voice Recognition software.

## 2024-05-23 NOTE — H&P
Hospitalist History and Physical     Freddy Long : 1995 MRN:[id number] LOS:0 ROOM: [facility]/     Reason for admission: Gluteal abscess     Assessment / Plan     Gluteal abscess    -- Has had I&D's done in freestanding ER X2  -Continue cefepime and vancomycin de-escalate as condition warrants  -General surgery following    Obesity  -encourage lifestyle modifications    Code Status (Patient has no pulse and is not breathing): CPR (Attempt to Resuscitate)  Medical Interventions (Patient has pulse or is breathing): Full Support       Nutrition:   NPO Diet NPO Type: Strict NPO     DVT prophylaxis:  Mechanical DVT prophylaxis orders are present.         History of Present illness     Freddy Long is a 28 y.o. male with no significant PMH  presented to the hospital for pain in coccyx, and was admitted with a principal diagnosis of Gluteal abscess.  Patient states approximately 2 weeks ago he began to have pain around his coccyx.  He noted it to be red and hard feeling.  [facility] and had a bedside I&D performed with minimal output.  He was given Bactrim to take outpatient.  Patient presented again today to [facility] ER where once again he had a bedside I&D without much drainage noted.  Outpatient emergency department spoke with Dr. Zambrano on-call for general surgery who plans for surgical incision and drainage in a.m.      ACP: Patient wishes to be full code with full intervention; his wife is his decision-maker if he is unable.    Patient was seen and examined on 24 at 01:37 EDT .    Subjective / Review of systems     Review of Systems   Constitutional:  Positive for fever. Negative for diaphoresis and fatigue.   HENT:  Negative for congestion and sore throat.    Eyes:  Negative for pain and redness.   Respiratory:  Negative for cough and shortness of breath.    Cardiovascular:  Negative for chest pain and leg swelling.   Gastrointestinal:  Negative for abdominal  pain, nausea and vomiting.   Endocrine: Negative for polydipsia and polyphagia.   Genitourinary:  Negative for dysuria and flank pain.   Musculoskeletal:  Negative for back pain and joint swelling.   Skin:  Positive for wound. Negative for color change and pallor.   Neurological:  Negative for dizziness and seizures.   Psychiatric/Behavioral:  Negative for behavioral problems and confusion.         Past Medical/Surgical/Social/Family History & Allergies     Past Medical History:   Diagnosis Date    Cyst near tailbone     3rd occurance      Past Surgical History:   Procedure Laterality Date    WISDOM TOOTH EXTRACTION  2021      Social History     Socioeconomic History    Marital status:    Tobacco Use    Smoking status: Never    Smokeless tobacco: Never   Vaping Use    Vaping status: Former   Substance and Sexual Activity    Alcohol use: Yes     Comment: socially    Drug use: Never      History reviewed. No pertinent family history.   No Known Allergies   Social Determinants of Health     Tobacco Use: Low Risk  (5/22/2024)    Patient History     Smoking Tobacco Use: Never     Smokeless Tobacco Use: Never     Passive Exposure: Not on file   Alcohol Use: Alcohol Misuse (5/22/2024)    AUDIT-C     Frequency of Alcohol Consumption: 2-4 times a month     Average Number of Drinks: 3 or 4     Frequency of Binge Drinking: Less than monthly   Financial Resource Strain: Not on file   Food Insecurity: Not on file   Transportation Needs: Not on file   Physical Activity: Not on file   Stress: Not on file   Social Connections: Unknown (5/20/2024)    Family and Community Support     Help with Day-to-Day Activities: Not on file     Lonely or Isolated: Not on file   Interpersonal Safety: Not At Risk (5/22/2024)    Abuse Screen     Unsafe at Home or Work/School: no     Feels Threatened by Someone?: no     Does Anyone Keep You from Contacting Others or Doint Things Outside the Home?: no     Physical Sign of Abuse Present: no    Depression: Not on file   Housing Stability: Unknown (5/22/2024)    Housing Stability     Current Living Arrangements: home     Potentially Unsafe Housing Conditions: Not on file   Utilities: Not on file   Health Literacy: Unknown (5/20/2024)    Education     Help with school or training?: Not on file     Preferred Language: Not on file   Employment: Unknown (5/20/2024)    Employment     Do you want help finding or keeping work or a job?: Not on file   Disabilities: Not At Risk (5/22/2024)    Disabilities     Concentrating, Remembering, or Making Decisions Difficulty: no     Doing Errands Independently Difficulty: no        Home Medications     Prior to Admission medications    Medication Sig Start Date End Date Taking? Authorizing Provider   sulfamethoxazole-trimethoprim (BACTRIM DS,SEPTRA DS) 800-160 MG per tablet Take 1 tablet by mouth 2 (Two) Times a Day. 5/20/24  Yes Gabby Hayward APRN        Objective / Physical Exam     Vital signs:  Temp: 98.7 °F (37.1 °C)  BP: 145/81  Heart Rate: 84  Resp: 16  SpO2: 95 %  Weight: (!) 139 kg (306 lb 7 oz)    Admission Weight: Weight: (!) 140 kg (309 lb)    Physical Exam  Vitals and nursing note reviewed.   Constitutional:       Appearance: Normal appearance.   HENT:      Head: Normocephalic.      Nose: Nose normal.      Mouth/Throat:      Mouth: Mucous membranes are moist.      Pharynx: Oropharynx is clear.   Eyes:      Pupils: Pupils are equal, round, and reactive to light.   Cardiovascular:      Rate and Rhythm: Normal rate and regular rhythm.      Pulses: Normal pulses.      Heart sounds: Normal heart sounds.   Pulmonary:      Effort: Pulmonary effort is normal.      Breath sounds: Normal breath sounds.   Abdominal:      General: Bowel sounds are normal.      Palpations: Abdomen is soft.   Musculoskeletal:         General: Normal range of motion.      Cervical back: Normal range of motion.   Skin:     General: Skin is warm and dry.      Capillary Refill: Capillary  refill takes 2 to 3 seconds.      Comments: Firm, erythematous, edematous area noted to coccyx   Neurological:      General: No focal deficit present.      Mental Status: He is alert and oriented to person, place, and time. Mental status is at baseline.   Psychiatric:         Mood and Affect: Mood normal.         Behavior: Behavior normal.         Thought Content: Thought content normal.         Judgment: Judgment normal.          Labs     Results from last 7 days   Lab Units 05/23/24  0105 05/22/24 1953   WBC 10*3/mm3 10.39 12.00*   HEMOGLOBIN g/dL 12.9* 14.4   HEMATOCRIT % 39.0 43.4   PLATELETS 10*3/mm3 235 248      Results from last 7 days   Lab Units 05/22/24 1953   ALK PHOS U/L 57   AST (SGOT) U/L 24   ALT (SGPT) U/L 35           Results from last 7 days   Lab Units 05/23/24  0105 05/22/24 1953   SODIUM mmol/L 137 135*   POTASSIUM mmol/L 4.1 3.9   CHLORIDE mmol/L 102 99   CO2 mmol/L 25.0 26.0   BUN mg/dL 12 11   CREATININE mg/dL 1.18 1.14   GLUCOSE mg/dL 149* 99        Imaging     No Radiology Exams Resulted Within Past 24 Hours       Current Medications     Scheduled Meds:  cefepime, 2,000 mg, Intravenous, Once  cefepime, 2,000 mg, Intravenous, Q8H  sodium chloride, 10 mL, Intravenous, Q12H  vancomycin, 1,500 mg, Intravenous, Q12H  vancomycin, 2,000 mg, Intravenous, Once         Continuous Infusions:  Pharmacy to dose Shirlene prieto APRN   Hospital Service  05/23/24   01:37 EDT

## 2024-05-23 NOTE — CONSULTS
General Surgery Consult Note      Name: Freddy Long ADMIT: 2024   : 1995  PCP: Provider, No Known    MRN: [id number] LOS: 0 days   AGE/SEX: 28 y.o. male  ROOM: [facility]      Patient Care Team:  Provider, No Known as PCP - General  Chief Complaint   Patient presents with    Abscess     Abscess to lower back       Subjective   28-year-old gentleman with no significant past medical or surgical history who presents with pilonidal abscess.  This is his third abscess last was a couple years ago.  Required drainage in the past never been taken to the operating room for drainage never had of cystectomy.  This was attempted to be drained twice in the emergency department has been on Bactrim with worsening fluctuance and erythema.    Past Medical History:   Diagnosis Date    Cyst near tailbone     3rd occurance    Pilonidal cyst with abscess 2019     Past Surgical History:   Procedure Laterality Date    WISDOM TOOTH EXTRACTION       Family History   Problem Relation Age of Onset    No Known Problems Mother     No Known Problems Father        Social History     Tobacco Use    Smoking status: Never    Smokeless tobacco: Never   Vaping Use    Vaping status: Former   Substance Use Topics    Alcohol use: Yes     Comment: socially    Drug use: Never     Medications Prior to Admission   Medication Sig Dispense Refill Last Dose    sulfamethoxazole-trimethoprim (BACTRIM DS,SEPTRA DS) 800-160 MG per tablet Take 1 tablet by mouth 2 (Two) Times a Day. 14 tablet 0 2024 at 0930     cefepime, 2,000 mg, Intravenous, Q8H  [Transfer Hold] sodium chloride, 10 mL, Intravenous, Q12H  vancomycin, 1,500 mg, Intravenous, Q12H      lactated ringers, 1,000 mL, Last Rate: 1,000 mL (24 0735)  Pharmacy to dose vancomycin,         [Transfer Hold] acetaminophen **OR** [Transfer Hold] acetaminophen **OR** [Transfer Hold] acetaminophen    [Transfer Hold] senna-docusate sodium **AND** [Transfer  "Hold] polyethylene glycol **AND** [Transfer Hold] bisacodyl **AND** [Transfer Hold] bisacodyl    [Transfer Hold] Calcium Replacement - Follow Nurse / BPA Driven Protocol    [Transfer Hold] Magnesium Low Dose Replacement - Follow Nurse / BPA Driven Protocol    [Transfer Hold] melatonin    [Transfer Hold] nitroglycerin    [Transfer Hold] ondansetron ODT **OR** [Transfer Hold] ondansetron    Pharmacy to dose vancomycin    [Transfer Hold] Phosphorus Replacement - Follow Nurse / BPA Driven Protocol    Potassium Replacement - Follow Nurse / BPA Driven Protocol    [COMPLETED] Insert peripheral IV **AND** [Transfer Hold] sodium chloride    [Transfer Hold] sodium chloride    sodium chloride    [Transfer Hold] sodium chloride  Patient has no known allergies.    Review of Systems   Constitutional:  Negative for chills and fever.   HENT:  Negative for rhinorrhea and trouble swallowing.    Eyes:  Negative for blurred vision and double vision.   Respiratory:  Negative for cough and shortness of breath.    Cardiovascular:  Negative for chest pain and palpitations.   Gastrointestinal:  Negative for abdominal distention and abdominal pain.   Musculoskeletal:  Negative for back pain and myalgias.   Skin:  Negative for color change and dry skin.   Neurological:  Negative for headache and confusion.   Psychiatric/Behavioral:  Negative for agitation and hallucinations.         Objective     Vital Signs and Labs:  Vital Signs Patient Vitals for the past 24 hrs:   BP Temp Temp src Pulse Resp SpO2 Height Weight   05/23/24 0734 126/55 98.5 °F (36.9 °C) Oral 83 16 93 % -- --   05/23/24 0513 141/71 99.3 °F (37.4 °C) Oral 90 20 96 % -- --   05/22/24 2304 145/81 98.7 °F (37.1 °C) Oral 84 16 95 % 195.6 cm (77\") (!) 139 kg (306 lb 7 oz)   05/22/24 2209 146/81 99.7 °F (37.6 °C) Oral 84 18 99 % -- --   05/22/24 1819 150/82 99.1 °F (37.3 °C) Oral 83 16 96 % 195.6 cm (77\") (!) 140 kg (309 lb)     I/O:  I/O last 3 completed shifts:  In: 500 [IV " Piggyback:500]  Out: -     Physical Exam:  Physical Exam  Constitutional:       General: He is not in acute distress.     Appearance: Normal appearance. He is not ill-appearing.   HENT:      Head: Normocephalic and atraumatic.      Right Ear: External ear normal.      Left Ear: External ear normal.   Eyes:      Extraocular Movements: Extraocular movements intact.      Conjunctiva/sclera: Conjunctivae normal.   Cardiovascular:      Rate and Rhythm: Normal rate and regular rhythm.   Pulmonary:      Effort: Pulmonary effort is normal. No respiratory distress.   Abdominal:      General: There is no distension.      Palpations: Abdomen is soft.      Tenderness: There is no abdominal tenderness.   Musculoskeletal:         General: No swelling or deformity.      Comments: Fluctuant erythematous area over the pilonidal region   Skin:     General: Skin is warm and dry.   Neurological:      Mental Status: He is alert and oriented to person, place, and time. Mental status is at baseline.         CBC    Results from last 7 days   Lab Units 05/23/24  0105 05/22/24 1953   WBC 10*3/mm3 10.39 12.00*   HEMOGLOBIN g/dL 12.9* 14.4   PLATELETS 10*3/mm3 235 248     BMP   Results from last 7 days   Lab Units 05/23/24  0105 05/22/24 1953   SODIUM mmol/L 137 135*   POTASSIUM mmol/L 4.1 3.9   CHLORIDE mmol/L 102 99   CO2 mmol/L 25.0 26.0   BUN mg/dL 12 11   CREATININE mg/dL 1.18 1.14   GLUCOSE mg/dL 149* 99   MAGNESIUM mg/dL 1.9  --      Radiology(recent) No radiology results for the last day    I reviewed the patient's new clinical results.    Assessment & Plan       Gluteal abscess      28-year-old gentleman with no significant past medical or surgical history who presents with pilonidal abscess.  This is his third abscess last was a couple years ago.  Required drainage in the past never been taken to the operating room for drainage never had of cystectomy.  This was attempted to be drained twice in the emergency department has been on  Bactrim with worsening fluctuance and erythema.  Plan incision and drainage in the operating room, can likely be discharged later today.  Already has a prescription for antibiotics which he needs to finish.  Can remove packing in 48 hours. Can return to work without restrictions on Monday. All this was discussed with patient.  He can follow-up me in the office in 2 weeks for wound check and discuss pilonidal cystectomy.      This note was created using Dragon Voice Recognition software.    Morris Zambrano MD  05/23/24  07:36 EDT

## 2024-05-23 NOTE — ANESTHESIA PREPROCEDURE EVALUATION
Anesthesia Evaluation     Patient summary reviewed and Nursing notes reviewed   NPO Solid Status: > 8 hours  NPO Liquid Status: > 8 hours           Airway   Mallampati: II  TM distance: >3 FB  Neck ROM: full  No difficulty expected  Dental - normal exam     Pulmonary - negative pulmonary ROS and normal exam   Cardiovascular - negative cardio ROS and normal exam        Neuro/Psych- negative ROS  GI/Hepatic/Renal/Endo - negative ROS     Musculoskeletal (-) negative ROS    Abdominal  - normal exam    Bowel sounds: normal.   Substance History - negative use     OB/GYN negative ob/gyn ROS         Other                    Anesthesia Plan    ASA 2     general     intravenous induction     Anesthetic plan, risks, benefits, and alternatives have been provided, discussed and informed consent has been obtained with: patient.  Pre-procedure education provided  Plan discussed with CRNA.    CODE STATUS:    Code Status (Patient has no pulse and is not breathing): CPR (Attempt to Resuscitate)  Medical Interventions (Patient has pulse or is breathing): Full Support

## 2024-05-23 NOTE — PROGRESS NOTES
"Pharmacy Antimicrobial Dosing Service    Subjective:  Freddy Long is a 28 y.o.male admitted with gluteal abscess. Pharmacy has been consulted to dose Vancomycin for possible SSTI.      Assessment/Plan    1. Day #1/5 Vancomycin: Goal -600 mcg*h/mL. Will give 2000mg (~18mg/kg DBW) IV once followed by 1500mg (~14mg/kg DBW) IV q12h for a predicted AUC ~535mcg*h/mL. Will obtain peak on 5/24 at 0500 and trough at 1200 prior to fourth dose.     2. Day #1/5 Cefepime: 2g IV q8h for estCrCl > 60 mL/min.    Will continue to monitor drug levels, renal function, culture and sensitivities, and patient clinical status.       Objective:  Relevant clinical data and objective history reviewed:  195.6 cm (77\")   (!) 139 kg (306 lb 7 oz)   Ideal body weight: 89.1 kg (196 lb 6.9 oz)  Adjusted ideal body weight: 109 kg (240 lb 6.9 oz)  Body mass index is 36.34 kg/m².        Results from last 7 days   Lab Units 05/22/24 1953   CREATININE mg/dL 1.14     Estimated Creatinine Clearance: 151.4 mL/min (by C-G formula based on SCr of 1.14 mg/dL).  No intake/output data recorded.    Results from last 7 days   Lab Units 05/22/24 1953   WBC 10*3/mm3 12.00*     Temperature    05/22/24 1819 05/22/24 2209 05/22/24 2304   Temp: 99.1 °F (37.3 °C) 99.7 °F (37.6 °C) 98.7 °F (37.1 °C)     Baseline culture/source/susceptibility:  Microbiology Results (last 10 days)       Procedure Component Value - Date/Time    Wound Culture - Swab, Buttock, Right [[id number]] Collected: 05/20/24 1847    Lab Status: Preliminary result Specimen: Swab from Buttock, Right Updated: 05/22/24 0847     Wound Culture Culture in progress     Gram Stain Rare (1+) WBCs per low power field      No organisms seen            Ailin Nelson PharmD  05/22/24 23:42 EDT    "

## 2024-05-23 NOTE — ED NOTES
Pt ambulatory for dc by private vehicle; PIV capped and wrapped; pt vss stable for transfer ; with SO; all belongings with pt

## 2024-05-23 NOTE — ED NOTES
Pt updated on room assignment; aware to remain npo; verbalized understanding of plan to leave piv in arm; signed paperwork regarding safety of iv

## 2024-05-23 NOTE — ED NOTES
Phone report to [facility] to GERALDINE Hyman; room pending clean by EVS; will send pt by private vehicle per AMBREEN Kramer once room ready

## 2024-05-24 ENCOUNTER — TELEPHONE (OUTPATIENT)
Dept: SURGERY | Facility: CLINIC | Age: 29
End: 2024-05-24
Payer: COMMERCIAL

## 2024-05-24 NOTE — TELEPHONE ENCOUNTER
Call back from patient, reporting he feels much better than he did before. Advised that was great and I would let [facility] know. Discussed post op visit and advised to call with any other questions or concerns prior to that appt. Patient expressed understanding of all discussed.

## 2024-05-26 LAB — BACTERIA SPEC ANAEROBE CULT: NORMAL

## 2024-05-27 LAB
BACTERIA SPEC AEROBE CULT: ABNORMAL
GRAM STN SPEC: ABNORMAL

## 2024-05-28 LAB
BACTERIA SPEC AEROBE CULT: NORMAL
BACTERIA SPEC AEROBE CULT: NORMAL
BACTERIA SPEC ANAEROBE CULT: ABNORMAL

## 2024-06-04 ENCOUNTER — OFFICE VISIT (OUTPATIENT)
Dept: SURGERY | Facility: CLINIC | Age: 29
End: 2024-06-04
Payer: COMMERCIAL

## 2024-06-04 VITALS
BODY MASS INDEX: 35.42 KG/M2 | TEMPERATURE: 98.7 F | WEIGHT: 300 LBS | DIASTOLIC BLOOD PRESSURE: 83 MMHG | HEART RATE: 74 BPM | SYSTOLIC BLOOD PRESSURE: 133 MMHG | HEIGHT: 77 IN | OXYGEN SATURATION: 98 %

## 2024-06-04 DIAGNOSIS — L05.01 PILONIDAL ABSCESS: Primary | ICD-10-CM

## 2024-06-04 PROBLEM — E66.01 MORBID (SEVERE) OBESITY DUE TO EXCESS CALORIES: Status: ACTIVE | Noted: 2024-06-04

## 2024-06-04 PROCEDURE — 99024 POSTOP FOLLOW-UP VISIT: CPT | Performed by: STUDENT IN AN ORGANIZED HEALTH CARE EDUCATION/TRAINING PROGRAM

## 2024-06-04 NOTE — PROGRESS NOTES
"Chief Complaint  Post-op (PO I&D Pilonidal cyst 5/23)    Subjective        Freddy Long presents to [facility] GENERAL SURGERY  History of Present Illness    28-year-old gentleman here for follow-up after his incision and drainage of pilonidal abscess.  Doing well, off antibiotics no fevers or chills no pain.  Abscess seems to have resolved.  This is his third pilonidal abscess he is interested in surgery to prevent recurrence.  Will get him set up to see Dr. Walters in the office in a couple weeks to discuss options for pilonidal cystectomy and treatment.  We discussed that he should shave his pilonidal region to decrease chance of recurrence.  No further restrictions from my standpoint he can follow-up me again in the future if needed.    Objective   Vital Signs:  /83 (BP Location: Right arm, Patient Position: Sitting, Cuff Size: Large Adult)   Pulse 74   Temp 98.7 °F (37.1 °C) (Infrared)   Ht 195.6 cm (77\")   Wt 136 kg (300 lb)   SpO2 98%   BMI 35.57 kg/m²   Estimated body mass index is 35.57 kg/m² as calculated from the following:    Height as of this encounter: 195.6 cm (77\").    Weight as of this encounter: 136 kg (300 lb).               Physical Exam  Constitutional:       General: He is not in acute distress.     Appearance: Normal appearance. He is not ill-appearing.   HENT:      Head: Normocephalic and atraumatic.      Right Ear: External ear normal.      Left Ear: External ear normal.   Eyes:      Extraocular Movements: Extraocular movements intact.      Conjunctiva/sclera: Conjunctivae normal.   Cardiovascular:      Rate and Rhythm: Normal rate and regular rhythm.   Pulmonary:      Effort: Pulmonary effort is normal. No respiratory distress.   Abdominal:      General: There is no distension.      Palpations: Abdomen is soft.      Tenderness: There is no abdominal tenderness.   Musculoskeletal:         General: No swelling or deformity.   Skin:     General: Skin is warm and " dry.   Neurological:      Mental Status: He is alert and oriented to person, place, and time. Mental status is at baseline.      Result Review :                     Assessment and Plan     Diagnoses and all orders for this visit:    1. Pilonidal abscess (Primary)      28-year-old gentleman here for follow-up after his incision and drainage of pilonidal abscess.  Doing well, off antibiotics no fevers or chills no pain.  Abscess seems to have resolved.  This is his third pilonidal abscess he is interested in surgery to prevent recurrence.  Will get him set up to see Dr. Walters in the office in a couple weeks to discuss options for pilonidal cystectomy and treatment.  We discussed that he should shave his pilonidal region to decrease chance of recurrence.  No further restrictions from my standpoint he can follow-up me again in the future if needed.         Follow Up     No follow-ups on file.  Patient was given instructions and counseling regarding his condition or for health maintenance advice. Please see specific information pulled into the AVS if appropriate.

## 2024-06-12 ENCOUNTER — TELEPHONE (OUTPATIENT)
Dept: SURGERY | Facility: CLINIC | Age: 29
End: 2024-06-12
Payer: COMMERCIAL

## 2024-06-12 NOTE — TELEPHONE ENCOUNTER
Patient called stating his Pilonidal cyst has opened yesterday and is draining. He is currently not taking an antibiotic. Temp is 99.1, no n/v.     Per Dr. Santiago:  Needs local wound care, not antibiotics are needed.    Called patient and recommendations given.  He understands if he starts running a temp, n/v or worsening redness, he will go to the ER.

## 2024-07-05 ENCOUNTER — OFFICE VISIT (OUTPATIENT)
Age: 29
End: 2024-07-05
Payer: COMMERCIAL

## 2024-07-05 VITALS
HEIGHT: 77 IN | HEART RATE: 86 BPM | WEIGHT: 304.2 LBS | DIASTOLIC BLOOD PRESSURE: 94 MMHG | OXYGEN SATURATION: 98 % | SYSTOLIC BLOOD PRESSURE: 138 MMHG | BODY MASS INDEX: 35.92 KG/M2 | TEMPERATURE: 98.4 F

## 2024-07-05 DIAGNOSIS — L05.01 PILONIDAL ABSCESS: Primary | ICD-10-CM

## 2024-07-05 DIAGNOSIS — E66.01 MORBID (SEVERE) OBESITY DUE TO EXCESS CALORIES: ICD-10-CM

## 2024-07-05 PROBLEM — L05.91 PILONIDAL CYST WITHOUT ABSCESS: Status: ACTIVE | Noted: 2024-07-05

## 2024-07-05 PROCEDURE — 99203 OFFICE O/P NEW LOW 30 MIN: CPT | Performed by: STUDENT IN AN ORGANIZED HEALTH CARE EDUCATION/TRAINING PROGRAM

## 2024-07-05 RX ORDER — SODIUM CHLORIDE 0.9 % (FLUSH) 0.9 %
3-10 SYRINGE (ML) INJECTION AS NEEDED
OUTPATIENT
Start: 2024-07-05

## 2024-07-05 RX ORDER — SODIUM CHLORIDE 0.9 % (FLUSH) 0.9 %
3 SYRINGE (ML) INJECTION EVERY 12 HOURS SCHEDULED
OUTPATIENT
Start: 2024-07-05

## 2024-07-05 RX ORDER — SODIUM CHLORIDE 9 MG/ML
40 INJECTION, SOLUTION INTRAVENOUS AS NEEDED
OUTPATIENT
Start: 2024-07-05

## 2024-07-05 NOTE — PROGRESS NOTES
Colorectal Surgery Consultation Note    ID:  Freddy Long;   : 1995  DATE OF VISIT: 2024    Chief Complaint  Cyst (FU Est Dr. Zambrano, I&D Pilonidal Cyst 24)       History of Present Illness  Freddy Long is a 28 y.o. male who I was asked to see in consultation by Dr. Zambrano to evaluate for pilonidal disease.    Patient has been having recurrent pilonidal abscess.  He was required to incision and drainage.  Continues to have drainage from the gluteal cleft.  Currently he denies any fevers or chills.  He has minimal drainage from the site that he keeps gauze.      Past Medical History  Past Medical History:   Diagnosis Date    Cyst near tailbone     3rd occurance    Pilonidal cyst with abscess 2019     Past Surgical History  Past Surgical History:   Procedure Laterality Date    INCISION AND DRAINAGE OF WOUND N/A 2024    Procedure: INCISION AND DRAINAGE OF PILONIDAL ABSCESS - PRONE;  Surgeon: Morris Zambrano MD;  Location: [facility] OR;  Service: General;  Laterality: N/A;    WISDOM TOOTH EXTRACTION       Family History  Family History   Problem Relation Age of Onset    No Known Problems Mother     No Known Problems Father      No colorectal cancer history in immediate family members.  Social History  Social History     Tobacco Use    Smoking status: Never     Passive exposure: Never    Smokeless tobacco: Never   Vaping Use    Vaping status: Former   Substance Use Topics    Alcohol use: Yes     Comment: socially    Drug use: Never     Medication List  @medcurrent@  Allergies  No Known Allergies  Review of Systems  All systems reviewed and are otherwise negative, pertinent positives noted in the HPI.    Physical Exam  General:  No acute distress  Head: Normocephalic, atraumatic  Neuro: Alert and oriented    External anorectal exam: mild skin irritation, 5 different gluteal cleft pits with minimal drainage.  No fistula.  Minimal drainage.  No surrounding  erythema    Assessment  -Pilonidal disease with recurrent abscess    Plan / Recommendations    1.  Discussed pathophysiology of pilonidal disease.  Discussed in detail regarding management and surgical options.  We have elected to proceed with minimal invasive GIPS procedure.  We have discussed the recurrence rate 10 to 15% with this approach however given its minimal invasive nature and quick recovery, it is acceptable.     2.  Follow-up at the time of surgery      Renan Walters MD  Colon and Rectal Surgery   [facility]

## 2024-07-30 ENCOUNTER — HOSPITAL ENCOUNTER (OUTPATIENT)
Dept: CARDIOLOGY | Facility: HOSPITAL | Age: 29
Discharge: HOME | End: 2024-07-30
Payer: COMMERCIAL

## 2024-07-30 ENCOUNTER — LAB (OUTPATIENT)
Dept: LAB | Facility: HOSPITAL | Age: 29
End: 2024-07-30
Payer: COMMERCIAL

## 2024-07-30 LAB
ANION GAP SERPL CALCULATED.3IONS-SCNC: 12.1 MMOL/L (ref 5–15)
BUN SERPL-MCNC: 12 MG/DL (ref 6–20)
BUN/CREAT SERPL: 12 (ref 7–25)
CALCIUM SPEC-SCNC: 9.6 MG/DL (ref 8.6–10.5)
CHLORIDE SERPL-SCNC: 104 MMOL/L (ref 98–107)
CO2 SERPL-SCNC: 23.9 MMOL/L (ref 22–29)
CREAT SERPL-MCNC: 1 MG/DL (ref 0.76–1.27)
DEPRECATED RDW RBC AUTO: 42.7 FL (ref 37–54)
EGFRCR SERPLBLD CKD-EPI 2021: 105.8 ML/MIN/1.73
ERYTHROCYTE [DISTWIDTH] IN BLOOD BY AUTOMATED COUNT: 13.2 % (ref 12.3–15.4)
GLUCOSE SERPL-MCNC: 102 MG/DL (ref 65–99)
HCT VFR BLD AUTO: 48.6 % (ref 37.5–51)
HGB BLD-MCNC: 16.1 G/DL (ref 13–17.7)
MCH RBC QN AUTO: 29 PG (ref 26.6–33)
MCHC RBC AUTO-ENTMCNC: 33.1 G/DL (ref 31.5–35.7)
MCV RBC AUTO: 87.6 FL (ref 79–97)
PLATELET # BLD AUTO: 258 10*3/MM3 (ref 140–450)
PMV BLD AUTO: 9.1 FL (ref 6–12)
POTASSIUM SERPL-SCNC: 4.4 MMOL/L (ref 3.5–5.2)
RBC # BLD AUTO: 5.55 10*6/MM3 (ref 4.14–5.8)
SODIUM SERPL-SCNC: 140 MMOL/L (ref 136–145)
WBC NRBC COR # BLD AUTO: 5.19 10*3/MM3 (ref 3.4–10.8)

## 2024-07-30 PROCEDURE — 80048 BASIC METABOLIC PNL TOTAL CA: CPT

## 2024-07-30 PROCEDURE — 93005 ELECTROCARDIOGRAM TRACING: CPT | Performed by: STUDENT IN AN ORGANIZED HEALTH CARE EDUCATION/TRAINING PROGRAM

## 2024-07-30 PROCEDURE — 85027 COMPLETE CBC AUTOMATED: CPT

## 2024-08-01 LAB
QT INTERVAL: 406 MS
QTC INTERVAL: 405 MS

## 2024-08-14 ENCOUNTER — ANESTHESIA EVENT (OUTPATIENT)
Dept: PERIOP | Facility: HOSPITAL | Age: 29
End: 2024-08-14
Payer: COMMERCIAL

## 2024-08-14 NOTE — ANESTHESIA PREPROCEDURE EVALUATION
Anesthesia Evaluation     NPO Solid Status: > 8 hours  NPO Liquid Status: > 8 hours           Airway   Mallampati: I  TM distance: >3 FB  Neck ROM: full  No difficulty expected  Dental - normal exam     Pulmonary - normal exam   Cardiovascular - normal exam        Neuro/Psych  GI/Hepatic/Renal/Endo    (+) obesity, morbid obesity    Musculoskeletal     Abdominal  - normal exam    Bowel sounds: normal.   Substance History      OB/GYN          Other        ROS/Med Hx Other: PILONIDAL 5/2024 GETTA MATHIAS 4 GRI              Anesthesia Plan    ASA 3     general and MAC     (SURGEON REQ MAC)  intravenous induction     Anesthetic plan, risks, benefits, and alternatives have been provided, discussed and informed consent has been obtained with: patient.  Pre-procedure education provided  Plan discussed with CRNA.    CODE STATUS:

## 2024-08-15 ENCOUNTER — ANESTHESIA (OUTPATIENT)
Dept: PERIOP | Facility: HOSPITAL | Age: 29
End: 2024-08-15
Payer: COMMERCIAL

## 2024-08-15 ENCOUNTER — HOSPITAL ENCOUNTER (OUTPATIENT)
Facility: HOSPITAL | Age: 29
Discharge: HOME | End: 2024-08-15
Attending: STUDENT IN AN ORGANIZED HEALTH CARE EDUCATION/TRAINING PROGRAM | Admitting: STUDENT IN AN ORGANIZED HEALTH CARE EDUCATION/TRAINING PROGRAM
Payer: COMMERCIAL

## 2024-08-15 VITALS
SYSTOLIC BLOOD PRESSURE: 122 MMHG | OXYGEN SATURATION: 96 % | BODY MASS INDEX: 35.78 KG/M2 | HEIGHT: 77 IN | WEIGHT: 303 LBS | RESPIRATION RATE: 15 BRPM | DIASTOLIC BLOOD PRESSURE: 70 MMHG | TEMPERATURE: 98.4 F | HEART RATE: 63 BPM

## 2024-08-15 DIAGNOSIS — L05.01 PILONIDAL ABSCESS: ICD-10-CM

## 2024-08-15 DIAGNOSIS — L05.91 PILONIDAL CYST WITHOUT ABSCESS: Primary | ICD-10-CM

## 2024-08-15 DIAGNOSIS — L05.91 PILONIDAL CYST: ICD-10-CM

## 2024-08-15 PROCEDURE — 25010000002 MIDAZOLAM PER 1 MG

## 2024-08-15 PROCEDURE — 46020 PLACEMENT OF SETON: CPT | Performed by: STUDENT IN AN ORGANIZED HEALTH CARE EDUCATION/TRAINING PROGRAM

## 2024-08-15 PROCEDURE — 11772 EXC PILONIDAL CYST COMP: CPT | Performed by: STUDENT IN AN ORGANIZED HEALTH CARE EDUCATION/TRAINING PROGRAM

## 2024-08-15 PROCEDURE — 25810000003 LACTATED RINGERS PER 1000 ML

## 2024-08-15 PROCEDURE — 25810000003 LACTATED RINGERS PER 1000 ML: Performed by: STUDENT IN AN ORGANIZED HEALTH CARE EDUCATION/TRAINING PROGRAM

## 2024-08-15 PROCEDURE — 25010000002 FENTANYL CITRATE (PF) 100 MCG/2ML SOLUTION

## 2024-08-15 PROCEDURE — 25010000002 CEFAZOLIN PER 500 MG: Performed by: STUDENT IN AN ORGANIZED HEALTH CARE EDUCATION/TRAINING PROGRAM

## 2024-08-15 PROCEDURE — 25010000002 GLYCOPYRROLATE 1 MG/5ML SOLUTION

## 2024-08-15 PROCEDURE — 88304 TISSUE EXAM BY PATHOLOGIST: CPT | Performed by: STUDENT IN AN ORGANIZED HEALTH CARE EDUCATION/TRAINING PROGRAM

## 2024-08-15 PROCEDURE — 25010000002 PROPOFOL 200 MG/20ML EMULSION

## 2024-08-15 PROCEDURE — 25010000002 ONDANSETRON PER 1 MG

## 2024-08-15 RX ORDER — LABETALOL HYDROCHLORIDE 5 MG/ML
5 INJECTION, SOLUTION INTRAVENOUS
Status: DISCONTINUED | OUTPATIENT
Start: 2024-08-15 | End: 2024-08-15 | Stop reason: HOSPADM

## 2024-08-15 RX ORDER — FENTANYL CITRATE 50 UG/ML
50 INJECTION, SOLUTION INTRAMUSCULAR; INTRAVENOUS
Status: DISCONTINUED | OUTPATIENT
Start: 2024-08-15 | End: 2024-08-15 | Stop reason: HOSPADM

## 2024-08-15 RX ORDER — HYDRALAZINE HYDROCHLORIDE 20 MG/ML
5 INJECTION INTRAMUSCULAR; INTRAVENOUS
Status: DISCONTINUED | OUTPATIENT
Start: 2024-08-15 | End: 2024-08-15 | Stop reason: HOSPADM

## 2024-08-15 RX ORDER — LIDOCAINE HYDROCHLORIDE AND EPINEPHRINE 10; 10 MG/ML; UG/ML
INJECTION, SOLUTION INFILTRATION; PERINEURAL AS NEEDED
Status: DISCONTINUED | OUTPATIENT
Start: 2024-08-15 | End: 2024-08-15 | Stop reason: HOSPADM

## 2024-08-15 RX ORDER — ONDANSETRON 2 MG/ML
4 INJECTION INTRAMUSCULAR; INTRAVENOUS ONCE AS NEEDED
Status: DISCONTINUED | OUTPATIENT
Start: 2024-08-15 | End: 2024-08-15 | Stop reason: HOSPADM

## 2024-08-15 RX ORDER — FENTANYL CITRATE 50 UG/ML
INJECTION, SOLUTION INTRAMUSCULAR; INTRAVENOUS AS NEEDED
Status: DISCONTINUED | OUTPATIENT
Start: 2024-08-15 | End: 2024-08-15 | Stop reason: SURG

## 2024-08-15 RX ORDER — ACETAMINOPHEN 160 MG
TABLET,DISINTEGRATING ORAL AS NEEDED
Status: DISCONTINUED | OUTPATIENT
Start: 2024-08-15 | End: 2024-08-15 | Stop reason: HOSPADM

## 2024-08-15 RX ORDER — DEXMEDETOMIDINE HYDROCHLORIDE 100 UG/ML
INJECTION, SOLUTION INTRAVENOUS AS NEEDED
Status: DISCONTINUED | OUTPATIENT
Start: 2024-08-15 | End: 2024-08-15 | Stop reason: SURG

## 2024-08-15 RX ORDER — SODIUM CHLORIDE, SODIUM LACTATE, POTASSIUM CHLORIDE, CALCIUM CHLORIDE 600; 310; 30; 20 MG/100ML; MG/100ML; MG/100ML; MG/100ML
INJECTION, SOLUTION INTRAVENOUS CONTINUOUS PRN
Status: DISCONTINUED | OUTPATIENT
Start: 2024-08-15 | End: 2024-08-15 | Stop reason: SURG

## 2024-08-15 RX ORDER — SODIUM CHLORIDE 0.9 % (FLUSH) 0.9 %
10 SYRINGE (ML) INJECTION AS NEEDED
Status: DISCONTINUED | OUTPATIENT
Start: 2024-08-15 | End: 2024-08-15 | Stop reason: HOSPADM

## 2024-08-15 RX ORDER — PROPOFOL 10 MG/ML
INJECTION, EMULSION INTRAVENOUS AS NEEDED
Status: DISCONTINUED | OUTPATIENT
Start: 2024-08-15 | End: 2024-08-15 | Stop reason: SURG

## 2024-08-15 RX ORDER — SODIUM CHLORIDE, SODIUM LACTATE, POTASSIUM CHLORIDE, CALCIUM CHLORIDE 600; 310; 30; 20 MG/100ML; MG/100ML; MG/100ML; MG/100ML
1000 INJECTION, SOLUTION INTRAVENOUS CONTINUOUS
Status: DISCONTINUED | OUTPATIENT
Start: 2024-08-15 | End: 2024-08-15 | Stop reason: HOSPADM

## 2024-08-15 RX ORDER — NALOXONE HCL 0.4 MG/ML
0.4 VIAL (ML) INJECTION AS NEEDED
Status: DISCONTINUED | OUTPATIENT
Start: 2024-08-15 | End: 2024-08-15 | Stop reason: HOSPADM

## 2024-08-15 RX ORDER — OXYCODONE HYDROCHLORIDE 5 MG/1
5 TABLET ORAL EVERY 8 HOURS PRN
Qty: 30 TABLET | Refills: 0 | Status: SHIPPED | OUTPATIENT
Start: 2024-08-15 | End: 2024-09-14

## 2024-08-15 RX ORDER — ALBUTEROL SULFATE 2.5 MG/3ML
2.5 SOLUTION RESPIRATORY (INHALATION) ONCE AS NEEDED
Status: DISCONTINUED | OUTPATIENT
Start: 2024-08-15 | End: 2024-08-15 | Stop reason: HOSPADM

## 2024-08-15 RX ORDER — SODIUM CHLORIDE 9 MG/ML
40 INJECTION, SOLUTION INTRAVENOUS AS NEEDED
Status: DISCONTINUED | OUTPATIENT
Start: 2024-08-15 | End: 2024-08-15 | Stop reason: HOSPADM

## 2024-08-15 RX ORDER — MIDAZOLAM HYDROCHLORIDE 1 MG/ML
INJECTION INTRAMUSCULAR; INTRAVENOUS AS NEEDED
Status: DISCONTINUED | OUTPATIENT
Start: 2024-08-15 | End: 2024-08-15 | Stop reason: SURG

## 2024-08-15 RX ORDER — FENTANYL CITRATE 50 UG/ML
25 INJECTION, SOLUTION INTRAMUSCULAR; INTRAVENOUS
Status: DISCONTINUED | OUTPATIENT
Start: 2024-08-15 | End: 2024-08-15 | Stop reason: HOSPADM

## 2024-08-15 RX ORDER — SODIUM CHLORIDE 0.9 % (FLUSH) 0.9 %
3 SYRINGE (ML) INJECTION EVERY 12 HOURS SCHEDULED
Status: DISCONTINUED | OUTPATIENT
Start: 2024-08-15 | End: 2024-08-15 | Stop reason: HOSPADM

## 2024-08-15 RX ORDER — LIDOCAINE HYDROCHLORIDE 20 MG/ML
INJECTION, SOLUTION EPIDURAL; INFILTRATION; INTRACAUDAL; PERINEURAL AS NEEDED
Status: DISCONTINUED | OUTPATIENT
Start: 2024-08-15 | End: 2024-08-15 | Stop reason: SURG

## 2024-08-15 RX ORDER — ACETAMINOPHEN 325 MG/1
650 TABLET ORAL ONCE AS NEEDED
Status: DISCONTINUED | OUTPATIENT
Start: 2024-08-15 | End: 2024-08-15 | Stop reason: HOSPADM

## 2024-08-15 RX ORDER — OXYCODONE HYDROCHLORIDE 5 MG/1
5 TABLET ORAL ONCE AS NEEDED
Status: DISCONTINUED | OUTPATIENT
Start: 2024-08-15 | End: 2024-08-15 | Stop reason: HOSPADM

## 2024-08-15 RX ORDER — ONDANSETRON 2 MG/ML
INJECTION INTRAMUSCULAR; INTRAVENOUS AS NEEDED
Status: DISCONTINUED | OUTPATIENT
Start: 2024-08-15 | End: 2024-08-15 | Stop reason: SURG

## 2024-08-15 RX ORDER — SODIUM CHLORIDE 0.9 % (FLUSH) 0.9 %
3-10 SYRINGE (ML) INJECTION AS NEEDED
Status: DISCONTINUED | OUTPATIENT
Start: 2024-08-15 | End: 2024-08-15 | Stop reason: HOSPADM

## 2024-08-15 RX ORDER — LIDOCAINE HYDROCHLORIDE 10 MG/ML
0.5 INJECTION, SOLUTION INFILTRATION; PERINEURAL ONCE AS NEEDED
Status: DISCONTINUED | OUTPATIENT
Start: 2024-08-15 | End: 2024-08-15 | Stop reason: HOSPADM

## 2024-08-15 RX ORDER — DIPHENHYDRAMINE HYDROCHLORIDE 50 MG/ML
12.5 INJECTION INTRAMUSCULAR; INTRAVENOUS
Status: DISCONTINUED | OUTPATIENT
Start: 2024-08-15 | End: 2024-08-15 | Stop reason: HOSPADM

## 2024-08-15 RX ORDER — GLYCOPYRROLATE 0.2 MG/ML
INJECTION INTRAMUSCULAR; INTRAVENOUS AS NEEDED
Status: DISCONTINUED | OUTPATIENT
Start: 2024-08-15 | End: 2024-08-15 | Stop reason: SURG

## 2024-08-15 RX ADMIN — PROPOFOL 150 MCG/KG/MIN: 10 INJECTION, EMULSION INTRAVENOUS at 07:33

## 2024-08-15 RX ADMIN — GLYCOPYRROLATE 0.3 MG: 0.2 INJECTION INTRAMUSCULAR; INTRAVENOUS at 07:27

## 2024-08-15 RX ADMIN — SODIUM CHLORIDE 2000 MG: 900 INJECTION INTRAVENOUS at 07:22

## 2024-08-15 RX ADMIN — SODIUM CHLORIDE, SODIUM LACTATE, POTASSIUM CHLORIDE, AND CALCIUM CHLORIDE: .6; .31; .03; .02 INJECTION, SOLUTION INTRAVENOUS at 07:25

## 2024-08-15 RX ADMIN — SODIUM CHLORIDE, POTASSIUM CHLORIDE, SODIUM LACTATE AND CALCIUM CHLORIDE 1000 ML: 600; 310; 30; 20 INJECTION, SOLUTION INTRAVENOUS at 07:22

## 2024-08-15 RX ADMIN — ONDANSETRON 4 MG: 2 INJECTION INTRAMUSCULAR; INTRAVENOUS at 07:27

## 2024-08-15 RX ADMIN — DEXMEDETOMIDINE 6 MCG: 100 INJECTION, SOLUTION INTRAVENOUS at 07:44

## 2024-08-15 RX ADMIN — PROPOFOL 50 MG: 10 INJECTION, EMULSION INTRAVENOUS at 07:32

## 2024-08-15 RX ADMIN — DEXMEDETOMIDINE 4 MCG: 100 INJECTION, SOLUTION INTRAVENOUS at 07:35

## 2024-08-15 RX ADMIN — FENTANYL CITRATE 50 MCG: 50 INJECTION, SOLUTION INTRAMUSCULAR; INTRAVENOUS at 07:44

## 2024-08-15 RX ADMIN — SODIUM CHLORIDE, POTASSIUM CHLORIDE, SODIUM LACTATE AND CALCIUM CHLORIDE 1000 ML: 600; 310; 30; 20 INJECTION, SOLUTION INTRAVENOUS at 06:52

## 2024-08-15 RX ADMIN — DEXMEDETOMIDINE 10 MCG: 100 INJECTION, SOLUTION INTRAVENOUS at 07:27

## 2024-08-15 RX ADMIN — FENTANYL CITRATE 25 MCG: 50 INJECTION, SOLUTION INTRAMUSCULAR; INTRAVENOUS at 07:32

## 2024-08-15 RX ADMIN — MIDAZOLAM 2 MG: 1 INJECTION INTRAMUSCULAR; INTRAVENOUS at 07:32

## 2024-08-15 RX ADMIN — LIDOCAINE HYDROCHLORIDE 100 MG: 20 INJECTION, SOLUTION EPIDURAL; INFILTRATION; INTRACAUDAL; PERINEURAL at 07:32

## 2024-08-15 RX ADMIN — FENTANYL CITRATE 25 MCG: 50 INJECTION, SOLUTION INTRAMUSCULAR; INTRAVENOUS at 07:39

## 2024-08-15 NOTE — DISCHARGE INSTRUCTIONS
"[facility]: Gian   Discharge Instructions: Surgery for Pilonidal Cysts  Follow-Up Appointment: ([phone])  Call the office within a day or so to schedule an appointment.  If a drain is in place, the first appointment is 2-4 days post-surgery for drain removal.  If no drain is used, the first appointment should be approximately three weeks after surgery.  Post-Surgery Rest:  Rest for the remainder of the surgery day.  Avoid sitting on a \"doughnut.\"  Activity:  On the day after surgery, resume normal activities without restrictions.  Walking, straining, and heavy lifting are safe.  Resumption of work is at your discretion.  Car or plane travel is safe.  Diet:  Eat three regular meals a day.  Consume at least three glasses of water daily, in addition to regular beverages.  Pain Management:  Take prescribed pain medication.  Use TWO types of pain medicine:  Non-steroidal anti-inflammatory agent (e.g., ibuprofen) to minimize inflammation and pain.  Narcotic pain medication as prescribed, taken every 3-4 hours as needed.  Non-steroidal medication may reduce the need for narcotics and minimize side effects.  Wound Care (Stitches but No Drain):  Keep the dressing for 24 hours, then it can be removed.  Shower or bathe normally, avoiding vigorous scrubbing.  If drainage persists, use a light dressing.  Wound Care (Stitches and a Drain):  Keep the dressing until seeing the doctor in the office.  Schedule an appointment 2-4 days post-surgery to remove the drain and inspect the incision.  Wound Care (No Stitches - Left Open):  Shower daily, allowing water to directly contact the open incision.  Put a dry dressing over the incision after showering to absorb drainage.  Post-Surgery Expectations:  Possible occurrences that should not cause alarm:  Drainage of bloody discharge (a tablespoonful or less).  Swelling around the anus.  Soreness, burning, itching, or dull aching.  Mild to moderate pain with bowel " movement.  Occasional passage of bright red blood.  Mild fatigue.  Mild fever or odor.  Emergency Situations:  Heavy bleeding is rare. If excessive bleeding is suspected, call the office at [phone]  Note:  If you have any questions or problems, do not hesitate to call the office.

## 2024-08-15 NOTE — ANESTHESIA POSTPROCEDURE EVALUATION
Patient: Freddy Long    Procedure Summary       Date: 08/15/24 Room / Location: [facility] OR 07 / [facility] MAIN OR    Anesthesia Start: 0725 Anesthesia Stop: 0807    Procedure: PILONIDAL CYSTECTOMY (Back) Diagnosis:       Pilonidal abscess      (Pilonidal abscess [L05.01])    Surgeons: Renan Walters MD Provider: Cameron Madrigal MD    Anesthesia Type: general, MAC ASA Status: 3            Anesthesia Type: general, MAC    Vitals  Vitals Value Taken Time   /64 08/15/24 0836   Temp 97.5 °F (36.4 °C) 08/15/24 0835   Pulse 71 08/15/24 0837   Resp 12 08/15/24 0835   SpO2 93 % 08/15/24 0837   Vitals shown include unfiled device data.        Post Anesthesia Care and Evaluation    Patient location during evaluation: PACU  Patient participation: complete - patient participated  Level of consciousness: awake  Pain scale: See nurse's notes for pain score.  Pain management: adequate    Airway patency: patent  Anesthetic complications: No anesthetic complications  PONV Status: none  Cardiovascular status: acceptable  Respiratory status: acceptable and spontaneous ventilation  Hydration status: acceptable    Comments: Patient seen and examined postoperatively; vital signs stable; SpO2 greater than or equal to 90%; cardiopulmonary status stable; nausea/vomiting adequately controlled; pain adequately controlled; no apparent anesthesia complications; patient discharged from anesthesia care when discharge criteria were met

## 2024-08-15 NOTE — PROGRESS NOTES
Colorectal Surgery Consultation Note    ID:  Freddy Long;   : 1995      Chief Complaint  No chief complaint on file.       History of Present Illness  Freddy Long is a 29 y.o. male who I was asked to see in consultation by Dr. Zambrano to evaluate for pilonidal disease.    Patient has been having recurrent pilonidal abscess.  He was required to incision and drainage.  Continues to have drainage from the gluteal cleft.  Currently he denies any fevers or chills.  He has minimal drainage from the site that he keeps gauze.      Past Medical History  Past Medical History:   Diagnosis Date    Cyst near tailbone     3rd occurance    Pilonidal cyst with abscess 2019     Past Surgical History  Past Surgical History:   Procedure Laterality Date    INCISION AND DRAINAGE OF WOUND N/A 2024    Procedure: INCISION AND DRAINAGE OF PILONIDAL ABSCESS - PRONE;  Surgeon: Morris Zambrano MD;  Location: [facility] MAIN OR;  Service: General;  Laterality: N/A;    WISDOM TOOTH EXTRACTION       Family History  Family History   Problem Relation Age of Onset    No Known Problems Mother     No Known Problems Father      No colorectal cancer history in immediate family members.  Social History  Social History     Tobacco Use    Smoking status: Never     Passive exposure: Never    Smokeless tobacco: Never   Vaping Use    Vaping status: Former   Substance Use Topics    Alcohol use: Yes     Comment: socially    Drug use: Never     Medication List  @medcurrent@  Allergies  No Known Allergies  Review of Systems  All systems reviewed and are otherwise negative, pertinent positives noted in the HPI.    Physical Exam  General:  No acute distress  Head: Normocephalic, atraumatic  Neuro: Alert and oriented    External anorectal exam: mild skin irritation, 5 different gluteal cleft pits with minimal drainage.  No fistula.  Minimal drainage.  No surrounding erythema    Assessment  -Pilonidal disease with recurrent  abscess    Plan / Recommendations    1.  Discussed pathophysiology of pilonidal disease.  Discussed in detail regarding management and surgical options.  We have elected to proceed with minimal invasive GIPS procedure.  We have discussed the recurrence rate 10 to 15% with this approach however given its minimal invasive nature and quick recovery, it is acceptable.     2.  Follow-up at the time of surgery      Renan Walters MD  Colon and Rectal Surgery   [facility]

## 2024-08-15 NOTE — OP NOTE
CRS Operative Note   Name: Freddy Long  : 1995  Date of Surgery: 8/15/2024  Pre-op Diagnosis: Pilonidal disease   Post-op Diagnosis: same  Procedure: GIPS Pilonidal cystectomy and fistulotomy  Surgeon: Renan Walters MD   Assistants: N/A  Anesthesia: Local/MAC  IV Fluids: refer to anesthesia record  Estimated Blood Loss: minimal  Drains: SETON DRAIN   Implants: none  Specimen: pilonidal cyst wall   Findings   6 pilonidal pits with a fistulous tract to the right buttocks.           Complications   No complications  Indication  29 y.o. male with a history of drainage from pilonidal disease     Description of Procedure  Informed consent was obtained and the patient was taken to the OR.  Anesthesia was administered. Hossein prominences were padded. The patient was place in the prone position. The lower back and perianal area was prepped and draped in sterile fashion.  There were 6 pits and a likely fistulous connection to the right of the cleft. A local block was performed.  All visible pits were resected with 5 mm punch biopsy. The cyst wall were removed ad sent for pathology. The pits were curetted and irrigated with hydrogen peroxide.     A fistula probe was inserted in the most crania pit and was noted to tract to the right of the cleft. A seton drain was placed to connect the fistulous tract and secured with silk ties.     Hemostasis was confirmed     Dressings were applied and the case was concluded.  Counts: Instrument, sponge, and needle counts were reported by the scrub nurse to be correct at the conclusion of the case.  Disposition  The patient was taken to Recovery Room in good condition.

## 2024-08-16 LAB
LAB AP CASE REPORT: NORMAL
PATH REPORT.FINAL DX SPEC: NORMAL
PATH REPORT.GROSS SPEC: NORMAL

## 2024-08-17 NOTE — H&P
Colorectal Surgery Preop Note    ID:  Freddy Long;     Chief Complaint  Screening colonoscopy     History of Present Illness  Freddy Long is a 29 y.o. male who is here for Screening colonoscopy      Past Medical History  Past Medical History:   Diagnosis Date    Cyst near tailbone     3rd occurance    Pilonidal cyst with abscess 01/08/2019     For further details please see prior notes and Health History Questionnaire scanned in Epic.  Past Surgical History  Past Surgical History:   Procedure Laterality Date    INCISION AND DRAINAGE OF WOUND N/A 5/23/2024    Procedure: INCISION AND DRAINAGE OF PILONIDAL ABSCESS - PRONE;  Surgeon: Morris Zambrano MD;  Location: [facility] MAIN OR;  Service: General;  Laterality: N/A;    PILONIDAL CYSTECTOMY N/A 8/15/2024    Procedure: PILONIDAL CYSTECTOMY;  Surgeon: Renan Walters MD;  Location: [facility] MAIN OR;  Service: General;  Laterality: N/A;    WISDOM TOOTH EXTRACTION  2021     For further details please see prior notes and Health History Questionnaire scanned in Epic.  Family History  Family History   Problem Relation Age of Onset    No Known Problems Mother     No Known Problems Father      For further details please see prior notes and Health History Questionnaire scanned in Epic.  Social History  Social History     Tobacco Use    Smoking status: Never     Passive exposure: Never    Smokeless tobacco: Never   Vaping Use    Vaping status: Former   Substance Use Topics    Alcohol use: Yes     Comment: socially    Drug use: Never     For further details please see prior notes and Health History Questionnaire scanned in Epic.  Medication List  No current facility-administered medications for this encounter.    Current Outpatient Medications:     oxyCODONE (Roxicodone) 5 MG immediate release tablet, Take 1 tablet by mouth Every 8 (Eight) Hours As Needed for Moderate Pain for up to 30 days., Disp: 30 tablet, Rfl: 0  For further details please see prior notes and Health  History Questionnaire scanned in Epic.  Allergies  No Known Allergies  Review of Systems  Pertinent positives noted in HPI.    Physical Exam  General:  No acute distress  Head: Normocephalic, atraumatic  CV: Regular rate, no edema, extremities warm and well perfused  Pulm: Symmetric chest rise, non-labored breathing  Neuro: Alert and oriented     Abdomen: Please see clinic note  Anorectal: Please see clinic note    Assessment  -Screening colonoscopy       Plan / Recommendations    1. Proceed to [facility] for screening colonoscopy       Renan Walters MD  Colon and Rectal Surgery   [facility]

## 2024-09-09 ENCOUNTER — OFFICE VISIT (OUTPATIENT)
Age: 29
End: 2024-09-09
Payer: COMMERCIAL

## 2024-09-09 VITALS
DIASTOLIC BLOOD PRESSURE: 96 MMHG | SYSTOLIC BLOOD PRESSURE: 149 MMHG | HEIGHT: 77 IN | TEMPERATURE: 99.6 F | HEART RATE: 97 BPM | OXYGEN SATURATION: 98 % | RESPIRATION RATE: 22 BRPM | WEIGHT: 315 LBS | BODY MASS INDEX: 37.19 KG/M2

## 2024-09-09 DIAGNOSIS — L05.01 PILONIDAL ABSCESS: Primary | ICD-10-CM

## 2024-09-09 PROCEDURE — 99024 POSTOP FOLLOW-UP VISIT: CPT | Performed by: STUDENT IN AN ORGANIZED HEALTH CARE EDUCATION/TRAINING PROGRAM

## 2024-09-10 NOTE — PROGRESS NOTES
Colorectal Surgery Followup Note    ID:  Freddy Long;   : 1995  DATE OF VISIT: 9/10/2024    Chief Complaint  Post-op (Pilonidal abscess sx 8/15 )       Subjective    Freddy is status post pilonidal cystectomy with drain placement.  He has been doing well since surgery.  He denies any pain.  Denies any discharge.   Exam  General:  No acute distress  Head: Normocephalic, atraumatic  Neuro: Alert and oriented    Lower back and gluteal cleft site without any sign of induration or fluctuant collection.  A seton drain is in place in the superior portion of the gluteal cleft.  There is no drainage.  Cystectomy site with some granulation.     Assessment  -Pilonidal abscess status post drainage and cystectomy with seton drain placement    Plan / Recommendations  -Doing well and recovering  -Counseled to keep the area clean.  -Will follow-up in 6 to 8 weeks for possibly removal of seton.       Renan Walters MD  Colon and Rectal Surgery   [facility]

## 2024-11-12 ENCOUNTER — TELEPHONE (OUTPATIENT)
Age: 29
End: 2024-11-12
Payer: COMMERCIAL

## 2024-11-12 NOTE — TELEPHONE ENCOUNTER
Spoke with pt to see if he could come in shanell 11/13 at 4pm to f/u with luanne patient stated that he would call back.

## 2024-12-09 ENCOUNTER — OFFICE VISIT (OUTPATIENT)
Age: 29
End: 2024-12-09
Payer: COMMERCIAL

## 2024-12-09 VITALS
SYSTOLIC BLOOD PRESSURE: 137 MMHG | DIASTOLIC BLOOD PRESSURE: 91 MMHG | HEIGHT: 77 IN | BODY MASS INDEX: 36.96 KG/M2 | WEIGHT: 313 LBS | HEART RATE: 77 BPM | OXYGEN SATURATION: 98 % | TEMPERATURE: 98 F

## 2024-12-09 DIAGNOSIS — L02.31 GLUTEAL ABSCESS: ICD-10-CM

## 2024-12-09 DIAGNOSIS — L05.01 PILONIDAL ABSCESS: Primary | ICD-10-CM

## 2024-12-09 PROCEDURE — 99024 POSTOP FOLLOW-UP VISIT: CPT | Performed by: STUDENT IN AN ORGANIZED HEALTH CARE EDUCATION/TRAINING PROGRAM

## 2024-12-09 NOTE — PROGRESS NOTES
Colorectal Surgery Followup Note    ID:  Freddy Long;   : 1995  DATE OF VISIT: 2024    Chief Complaint  Follow-up (F/U Pilonidal abscess )       Subjective     Freddy reports that he has been doing well.  He denies any pain.  Denies any discharge.  He has no complaints.  Exam  General:  No acute distress  Head: Normocephalic, atraumatic  Neuro: Alert and oriented     Lower back and gluteal cleft site without any sign of induration or fluctuant collection.  A seton drain is in place in the superior portion of the gluteal cleft.  There is no drainage.  Cystectomy site with some granulation.  There is significant air trapping and foreign body around the seton.  The seton was removed at bedside.  The wound was irrigated and washed.  The surrounding here was shaved.     Assessment  -Pilonidal abscess status post drainage and cystectomy with seton drain placement     Plan / Recommendations  -Doing well and recovering  -Counseled regarding importance of hygiene and keeping the area dry and clean.  -Will follow-up in 6 to 8 weeks         Renan Walters MD  Colon and Rectal Surgery   [facility]

## 2025-05-07 ENCOUNTER — HOSPITAL ENCOUNTER (EMERGENCY)
Facility: HOSPITAL | Age: 30
Discharge: HOME | End: 2025-05-07
Attending: EMERGENCY MEDICINE
Payer: COMMERCIAL

## 2025-05-07 VITALS
WEIGHT: 315 LBS | SYSTOLIC BLOOD PRESSURE: 144 MMHG | DIASTOLIC BLOOD PRESSURE: 86 MMHG | RESPIRATION RATE: 18 BRPM | HEIGHT: 77 IN | BODY MASS INDEX: 37.19 KG/M2 | OXYGEN SATURATION: 96 % | TEMPERATURE: 99.8 F | HEART RATE: 102 BPM

## 2025-05-07 DIAGNOSIS — L05.01 PILONIDAL ABSCESS: Primary | ICD-10-CM

## 2025-05-07 PROCEDURE — 99283 EMERGENCY DEPT VISIT LOW MDM: CPT

## 2025-05-07 PROCEDURE — 87205 SMEAR GRAM STAIN: CPT | Performed by: EMERGENCY MEDICINE

## 2025-05-07 PROCEDURE — 87070 CULTURE OTHR SPECIMN AEROBIC: CPT | Performed by: EMERGENCY MEDICINE

## 2025-05-07 PROCEDURE — 87186 SC STD MICRODIL/AGAR DIL: CPT | Performed by: EMERGENCY MEDICINE

## 2025-05-07 PROCEDURE — 87077 CULTURE AEROBIC IDENTIFY: CPT | Performed by: EMERGENCY MEDICINE

## 2025-05-07 PROCEDURE — 25010000002 LIDOCAINE 1% - EPINEPHRINE 1:100000 1 %-1:100000 SOLUTION: Performed by: EMERGENCY MEDICINE

## 2025-05-07 RX ORDER — SULFAMETHOXAZOLE AND TRIMETHOPRIM 800; 160 MG/1; MG/1
1 TABLET ORAL 2 TIMES DAILY
Qty: 20 TABLET | Refills: 0 | Status: SHIPPED | OUTPATIENT
Start: 2025-05-07 | End: 2025-05-11 | Stop reason: ALTCHOICE

## 2025-05-07 RX ORDER — SULFAMETHOXAZOLE AND TRIMETHOPRIM 800; 160 MG/1; MG/1
1 TABLET ORAL ONCE
Status: COMPLETED | OUTPATIENT
Start: 2025-05-07 | End: 2025-05-07

## 2025-05-07 RX ORDER — LIDOCAINE HYDROCHLORIDE AND EPINEPHRINE 10; 10 MG/ML; UG/ML
10 INJECTION, SOLUTION INFILTRATION; PERINEURAL ONCE
Status: COMPLETED | OUTPATIENT
Start: 2025-05-07 | End: 2025-05-07

## 2025-05-07 RX ADMIN — SULFAMETHOXAZOLE AND TRIMETHOPRIM 1 TABLET: 800; 160 TABLET ORAL at 22:38

## 2025-05-07 RX ADMIN — LIDOCAINE HYDROCHLORIDE,EPINEPHRINE BITARTRATE 10 ML: 10; .01 INJECTION, SOLUTION INFILTRATION; PERINEURAL at 22:33

## 2025-05-08 NOTE — ED PROVIDER NOTES
Subjective   History of Present Illness  Chief complaint: Abscess    29-year-old male presents with a pilonidal abscess.  Patient states he has had this before and had surgery last August for this.  He has noticed it over the past 3 days.  He has made an appointment with his surgeon and is scheduled to see him in a week.  He states he has noticed some drainage.  He states it is very painful.    History provided by:  Patient      Review of Systems   HENT:  Negative for congestion.    Respiratory:  Negative for cough and shortness of breath.    Cardiovascular:  Negative for chest pain.   Gastrointestinal:  Negative for abdominal pain and vomiting.   Neurological:  Negative for headaches.       Past Medical History:   Diagnosis Date    Cyst near tailbone     3rd occurance    Pilonidal cyst with abscess 01/08/2019       No Known Allergies    Past Surgical History:   Procedure Laterality Date    INCISION AND DRAINAGE OF WOUND N/A 5/23/2024    Procedure: INCISION AND DRAINAGE OF PILONIDAL ABSCESS - PRONE;  Surgeon: Morris Zambrano MD;  Location: [facility];  Service: General;  Laterality: N/A;    PILONIDAL CYSTECTOMY N/A 8/15/2024    Procedure: PILONIDAL CYSTECTOMY;  Surgeon: Renan Walters MD;  Location: [facility] OR;  Service: General;  Laterality: N/A;    WISDOM TOOTH EXTRACTION  2021       Family History   Problem Relation Age of Onset    No Known Problems Mother     No Known Problems Father        Social History     Socioeconomic History    Marital status:    Tobacco Use    Smoking status: Never     Passive exposure: Never    Smokeless tobacco: Never   Vaping Use    Vaping status: Former   Substance and Sexual Activity    Alcohol use: Yes     Comment: socially    Drug use: Never    Sexual activity: Yes     Partners: Female           Objective   Physical Exam  Vitals and nursing note reviewed.   Constitutional:       Appearance: Normal appearance.   HENT:      Head: Normocephalic and atraumatic.       Mouth/Throat:      Mouth: Mucous membranes are moist.   Cardiovascular:      Rate and Rhythm: Normal rate and regular rhythm.   Pulmonary:      Effort: Pulmonary effort is normal. No respiratory distress.   Genitourinary:     Comments: There is a large area of erythema, induration, fluctuance to the superior aspect of the gluteal cleft consistent with pilonidal abscess.  No active drainage.  Skin:     General: Skin is warm and dry.   Neurological:      Mental Status: He is alert and oriented to person, place, and time.         Incision & Drainage    Date/Time: 5/7/2025 10:30 PM    Performed by: Jean Page MD  Authorized by: Jean Page MD    Consent:     Consent obtained:  Verbal    Consent given by:  Patient  Universal protocol:     Patient identity confirmed:  Verbally with patient  Location:     Type:  Abscess    Location:  Anogenital    Anogenital location:  Pilonidal  Pre-procedure details:     Skin preparation:  Povidone-iodine  Sedation:     Sedation type:  None  Anesthesia:     Anesthesia method:  Local infiltration    Local anesthetic:  Lidocaine 1% WITH epi (8 cc)  Procedure type:     Complexity:  Complex  Procedure details:     Incision types:  Stab incision    Wound management:  Probed and deloculated    Drainage:  Bloody and purulent    Drainage amount:  Copious    Packing materials:  1/4 in iodoform gauze  Post-procedure details:     Procedure completion:  Tolerated well, no immediate complications             ED Course                                                       Medical Decision Making  Amount and/or Complexity of Data Reviewed  Labs: ordered.    Risk  Prescription drug management.      Incision and drainage was performed as detailed above.  Culture was sent to the lab.  Patient tolerated procedure well.  He will be discharged with a prescription for Bactrim.  He was given a dose of Bactrim in the emergency room as well.  He will see his surgeon on Wednesday next week.      Final  diagnoses:   Pilonidal abscess       ED Disposition  ED Disposition       ED Disposition   Discharge    Condition   Stable    Comment   --               Renan Walters MD  [address] [place] [address] [phone]    Call in 2 days           Medication List        New Prescriptions      sulfamethoxazole-trimethoprim 800-160 MG per tablet  Commonly known as: BACTRIM DS,SEPTRA DS  Take 1 tablet by mouth 2 (Two) Times a Day for 10 days.               Where to Get Your Medications        These medications were sent to [id number] - [place], [place] - [address] - [phone]  - [phone] [facility] [place] [address]      Phone: [phone]   sulfamethoxazole-trimethoprim 800-160 MG per tablet            Jean Page MD  05/07/25 6474

## 2025-05-08 NOTE — DISCHARGE INSTRUCTIONS
Follow-up with your surgeon as scheduled.  Return to the emergency room for any new or worsening symptoms or if you have any other questions or concerns.  Take antibiotic as prescribed.  Remove packing in 2 to 3 days.  It is okay if packing falls out before then.

## 2025-05-10 LAB
BACTERIA SPEC AEROBE CULT: ABNORMAL
GRAM STN SPEC: ABNORMAL
GRAM STN SPEC: ABNORMAL

## 2025-05-11 RX ORDER — PENICILLIN V POTASSIUM 250 MG/1
250 TABLET, FILM COATED ORAL 4 TIMES DAILY
Qty: 20 TABLET | Refills: 0 | Status: SHIPPED | OUTPATIENT
Start: 2025-05-11 | End: 2025-05-11

## 2025-05-11 RX ORDER — PENICILLIN V POTASSIUM 250 MG/1
500 TABLET, FILM COATED ORAL 4 TIMES DAILY
Qty: 40 TABLET | Refills: 0 | Status: SHIPPED | OUTPATIENT
Start: 2025-05-11

## 2025-05-11 NOTE — PROGRESS NOTES
Patient wound culture resulted with  Streptococcus Anginosus . Susceptible to Penicillins. Patient was given Rx for sulfamethoxazole/ trimethoprim. Therapy is insufficient coverage. Discussed with Dr. Blanco. New prescription for Penicillin VK 500mg q6h x 5 days called to the patient's preferred pharmacy: [facility] in [place], [place]. Spoke with patient who agreed with treatment plan..    Microbiology Results (last 10 days)       Procedure Component Value - Date/Time    Wound Culture - Drainage, Buttock [[id number]]  (Abnormal)  (Susceptibility) Collected: 05/07/25 2228    Lab Status: Final result Specimen: Drainage from Buttock Updated: 05/10/25 1221     Wound Culture Moderate growth (3+) Streptococcus anginosus     Gram Stain Many (4+) WBCs per low power field      Few (2+) Gram positive cocci in chains    Susceptibility        Streptococcus anginosus      MAHIN      Ceftriaxone 0.25 ug/ml Susceptible      Penicillin G <=0.06 ug/ml Susceptible      Vancomycin 0.5 ug/ml Susceptible                                   Gwendolyn Camacho RPH  5/11/2025 15:50 EDT

## 2025-05-14 ENCOUNTER — OFFICE VISIT (OUTPATIENT)
Age: 30
End: 2025-05-14
Payer: COMMERCIAL

## 2025-05-14 VITALS
TEMPERATURE: 97.8 F | OXYGEN SATURATION: 97 % | HEART RATE: 113 BPM | SYSTOLIC BLOOD PRESSURE: 117 MMHG | WEIGHT: 311 LBS | DIASTOLIC BLOOD PRESSURE: 80 MMHG | BODY MASS INDEX: 36.72 KG/M2 | HEIGHT: 77 IN

## 2025-05-14 DIAGNOSIS — L05.01 PILONIDAL ABSCESS: Primary | ICD-10-CM

## 2025-05-14 RX ORDER — SODIUM CHLORIDE 9 MG/ML
40 INJECTION, SOLUTION INTRAVENOUS AS NEEDED
OUTPATIENT
Start: 2025-05-14

## 2025-05-14 RX ORDER — SODIUM CHLORIDE 0.9 % (FLUSH) 0.9 %
10 SYRINGE (ML) INJECTION EVERY 12 HOURS SCHEDULED
OUTPATIENT
Start: 2025-05-14

## 2025-05-14 RX ORDER — SODIUM CHLORIDE 0.9 % (FLUSH) 0.9 %
10 SYRINGE (ML) INJECTION AS NEEDED
OUTPATIENT
Start: 2025-05-14

## 2025-05-14 RX ORDER — CHLORHEXIDINE GLUCONATE 500 MG/1
1 CLOTH TOPICAL EVERY 12 HOURS PRN
OUTPATIENT
Start: 2025-05-14

## 2025-05-17 NOTE — PROGRESS NOTES
Colorectal Surgery Followup Note    ID:  rFeddy Long;   : 1995  DATE OF VISIT: 2025    Chief Complaint  Follow-up (Follow up pilonidal cyst )       Subjective  Patient had a GIPS Pilonidal cystectomy and fistulotomy with seton placement performed on 8/15/2024. Seton was removed on 2024.   Patient reports swelling and intense pain in the area of his cystectomy site beginning on the  of this month. He visited the ER on the  where he was diagnosed with a pilonidal abscess and an I&D was performed. Patient was placed on Bactrim, but after culture reports were obtained on the , he was switched to Penicillin VK 500mg. He states the pain has improved since the I&D but that the area continues to have drainage. He denies any fevers, nausea, or vomiting at this time.     Exam  General:  No acute distress  Head: Normocephalic, atraumatic  Neuro: Alert and oriented  Lower Back: There is a large area of erythema and fluctuance in gluteal cleft consistent with pilonidal abscess. Purulent drainage present. Appears some midline pits may have reopened since previous surgery.      Assessment  -Pilonidal abscess     Plan / Recommendations  -I have recommended proceeding with another pilonidal cystectomy, given the recurrence location and lack of resolution after antibiotic course. Patient agreed. All questions were answered.   -Counseled regarding importance of hygiene and keeping the area dry and clean.  -Will follow-up at time of surgery    Alexandra Chavez PA-C  Colon and Rectal Surgery   [facility]

## 2025-06-17 ENCOUNTER — HOSPITAL ENCOUNTER (OUTPATIENT)
Facility: HOSPITAL | Age: 30
Setting detail: HOSPITAL OUTPATIENT SURGERY
Discharge: HOME | End: 2025-06-17
Attending: STUDENT IN AN ORGANIZED HEALTH CARE EDUCATION/TRAINING PROGRAM | Admitting: STUDENT IN AN ORGANIZED HEALTH CARE EDUCATION/TRAINING PROGRAM
Payer: COMMERCIAL

## 2025-06-17 ENCOUNTER — ANESTHESIA EVENT (OUTPATIENT)
Dept: PERIOP | Facility: HOSPITAL | Age: 30
End: 2025-06-17
Payer: COMMERCIAL

## 2025-06-17 ENCOUNTER — ANESTHESIA (OUTPATIENT)
Dept: PERIOP | Facility: HOSPITAL | Age: 30
End: 2025-06-17
Payer: COMMERCIAL

## 2025-06-17 VITALS
RESPIRATION RATE: 18 BRPM | HEART RATE: 73 BPM | DIASTOLIC BLOOD PRESSURE: 70 MMHG | SYSTOLIC BLOOD PRESSURE: 121 MMHG | HEIGHT: 77 IN | OXYGEN SATURATION: 93 % | BODY MASS INDEX: 36.39 KG/M2 | WEIGHT: 308.2 LBS | TEMPERATURE: 98.2 F

## 2025-06-17 DIAGNOSIS — L05.91 PILONIDAL CYST WITHOUT ABSCESS: Primary | ICD-10-CM

## 2025-06-17 DIAGNOSIS — L05.01 PILONIDAL ABSCESS: ICD-10-CM

## 2025-06-17 PROCEDURE — 25810000003 SODIUM CHLORIDE 0.9 % SOLUTION 1,000 ML FLEX CONT: Performed by: PHYSICIAN ASSISTANT

## 2025-06-17 PROCEDURE — 25810000003 LACTATED RINGERS PER 1000 ML: Performed by: REGISTERED NURSE

## 2025-06-17 PROCEDURE — 25010000002 CEFAZOLIN 3 G RECONSTITUTED SOLUTION 1 EACH VIAL: Performed by: STUDENT IN AN ORGANIZED HEALTH CARE EDUCATION/TRAINING PROGRAM

## 2025-06-17 PROCEDURE — 11770 EXC PILONIDAL CYST SIMPLE: CPT | Performed by: STUDENT IN AN ORGANIZED HEALTH CARE EDUCATION/TRAINING PROGRAM

## 2025-06-17 PROCEDURE — 25010000002 LIDOCAINE PF 2% 2 % SOLUTION: Performed by: REGISTERED NURSE

## 2025-06-17 PROCEDURE — 25010000002 HYDROMORPHONE 1 MG/ML SOLUTION: Performed by: REGISTERED NURSE

## 2025-06-17 PROCEDURE — 25010000002 FENTANYL CITRATE (PF) 100 MCG/2ML SOLUTION: Performed by: REGISTERED NURSE

## 2025-06-17 PROCEDURE — 25010000002 ONDANSETRON PER 1 MG: Performed by: REGISTERED NURSE

## 2025-06-17 PROCEDURE — 25010000002 DEXAMETHASONE PER 1 MG: Performed by: REGISTERED NURSE

## 2025-06-17 PROCEDURE — 25010000002 MIDAZOLAM PER 1 MG: Performed by: REGISTERED NURSE

## 2025-06-17 PROCEDURE — 25010000002 SUGAMMADEX 200 MG/2ML SOLUTION: Performed by: REGISTERED NURSE

## 2025-06-17 PROCEDURE — 25010000002 PROPOFOL 10 MG/ML EMULSION: Performed by: REGISTERED NURSE

## 2025-06-17 RX ORDER — TRAMADOL HYDROCHLORIDE 50 MG/1
50 TABLET ORAL EVERY 6 HOURS PRN
Qty: 30 TABLET | Refills: 0 | Status: SHIPPED | OUTPATIENT
Start: 2025-06-17

## 2025-06-17 RX ORDER — FLUMAZENIL 0.1 MG/ML
0.2 INJECTION INTRAVENOUS AS NEEDED
Status: DISCONTINUED | OUTPATIENT
Start: 2025-06-17 | End: 2025-06-17 | Stop reason: HOSPADM

## 2025-06-17 RX ORDER — DIPHENHYDRAMINE HYDROCHLORIDE 50 MG/ML
12.5 INJECTION, SOLUTION INTRAMUSCULAR; INTRAVENOUS
Status: DISCONTINUED | OUTPATIENT
Start: 2025-06-17 | End: 2025-06-17 | Stop reason: HOSPADM

## 2025-06-17 RX ORDER — OXYCODONE HYDROCHLORIDE 5 MG/1
5 TABLET ORAL ONCE AS NEEDED
Status: DISCONTINUED | OUTPATIENT
Start: 2025-06-17 | End: 2025-06-17 | Stop reason: HOSPADM

## 2025-06-17 RX ORDER — LIDOCAINE HYDROCHLORIDE 20 MG/ML
INJECTION, SOLUTION EPIDURAL; INFILTRATION; INTRACAUDAL; PERINEURAL AS NEEDED
Status: DISCONTINUED | OUTPATIENT
Start: 2025-06-17 | End: 2025-06-17 | Stop reason: SURG

## 2025-06-17 RX ORDER — ONDANSETRON 2 MG/ML
4 INJECTION INTRAMUSCULAR; INTRAVENOUS ONCE AS NEEDED
Status: DISCONTINUED | OUTPATIENT
Start: 2025-06-17 | End: 2025-06-17 | Stop reason: HOSPADM

## 2025-06-17 RX ORDER — NALOXONE HCL 0.4 MG/ML
0.4 VIAL (ML) INJECTION AS NEEDED
Status: DISCONTINUED | OUTPATIENT
Start: 2025-06-17 | End: 2025-06-17 | Stop reason: HOSPADM

## 2025-06-17 RX ORDER — HYDRALAZINE HYDROCHLORIDE 20 MG/ML
5 INJECTION INTRAMUSCULAR; INTRAVENOUS
Status: DISCONTINUED | OUTPATIENT
Start: 2025-06-17 | End: 2025-06-17 | Stop reason: HOSPADM

## 2025-06-17 RX ORDER — ROCURONIUM BROMIDE 10 MG/ML
INJECTION, SOLUTION INTRAVENOUS AS NEEDED
Status: DISCONTINUED | OUTPATIENT
Start: 2025-06-17 | End: 2025-06-17 | Stop reason: SURG

## 2025-06-17 RX ORDER — MEPERIDINE HYDROCHLORIDE 25 MG/ML
12.5 INJECTION INTRAMUSCULAR; INTRAVENOUS; SUBCUTANEOUS
Status: DISCONTINUED | OUTPATIENT
Start: 2025-06-17 | End: 2025-06-17 | Stop reason: HOSPADM

## 2025-06-17 RX ORDER — SODIUM CHLORIDE 9 MG/ML
40 INJECTION, SOLUTION INTRAVENOUS AS NEEDED
Status: DISCONTINUED | OUTPATIENT
Start: 2025-06-17 | End: 2025-06-17 | Stop reason: HOSPADM

## 2025-06-17 RX ORDER — SODIUM CHLORIDE 0.9 % (FLUSH) 0.9 %
10 SYRINGE (ML) INJECTION AS NEEDED
Status: DISCONTINUED | OUTPATIENT
Start: 2025-06-17 | End: 2025-06-17 | Stop reason: HOSPADM

## 2025-06-17 RX ORDER — LABETALOL HYDROCHLORIDE 5 MG/ML
5 INJECTION, SOLUTION INTRAVENOUS
Status: DISCONTINUED | OUTPATIENT
Start: 2025-06-17 | End: 2025-06-17 | Stop reason: HOSPADM

## 2025-06-17 RX ORDER — DIPHENHYDRAMINE HYDROCHLORIDE 50 MG/ML
12.5 INJECTION, SOLUTION INTRAMUSCULAR; INTRAVENOUS ONCE AS NEEDED
Status: DISCONTINUED | OUTPATIENT
Start: 2025-06-17 | End: 2025-06-17 | Stop reason: HOSPADM

## 2025-06-17 RX ORDER — FENTANYL CITRATE 50 UG/ML
INJECTION, SOLUTION INTRAMUSCULAR; INTRAVENOUS AS NEEDED
Status: DISCONTINUED | OUTPATIENT
Start: 2025-06-17 | End: 2025-06-17 | Stop reason: SURG

## 2025-06-17 RX ORDER — PROPOFOL 10 MG/ML
VIAL (ML) INTRAVENOUS AS NEEDED
Status: DISCONTINUED | OUTPATIENT
Start: 2025-06-17 | End: 2025-06-17 | Stop reason: SURG

## 2025-06-17 RX ORDER — SODIUM CHLORIDE 0.9 % (FLUSH) 0.9 %
10 SYRINGE (ML) INJECTION EVERY 12 HOURS SCHEDULED
Status: DISCONTINUED | OUTPATIENT
Start: 2025-06-17 | End: 2025-06-17 | Stop reason: HOSPADM

## 2025-06-17 RX ORDER — EPHEDRINE SULFATE 5 MG/ML
5 INJECTION INTRAVENOUS ONCE AS NEEDED
Status: DISCONTINUED | OUTPATIENT
Start: 2025-06-17 | End: 2025-06-17 | Stop reason: HOSPADM

## 2025-06-17 RX ORDER — SODIUM CHLORIDE, SODIUM LACTATE, POTASSIUM CHLORIDE, CALCIUM CHLORIDE 600; 310; 30; 20 MG/100ML; MG/100ML; MG/100ML; MG/100ML
INJECTION, SOLUTION INTRAVENOUS CONTINUOUS PRN
Status: DISCONTINUED | OUTPATIENT
Start: 2025-06-17 | End: 2025-06-17 | Stop reason: SURG

## 2025-06-17 RX ORDER — ONDANSETRON 2 MG/ML
INJECTION INTRAMUSCULAR; INTRAVENOUS AS NEEDED
Status: DISCONTINUED | OUTPATIENT
Start: 2025-06-17 | End: 2025-06-17 | Stop reason: SURG

## 2025-06-17 RX ORDER — CHLORHEXIDINE GLUCONATE 500 MG/1
1 CLOTH TOPICAL EVERY 12 HOURS PRN
Status: DISCONTINUED | OUTPATIENT
Start: 2025-06-17 | End: 2025-06-17 | Stop reason: HOSPADM

## 2025-06-17 RX ORDER — MIDAZOLAM HYDROCHLORIDE 1 MG/ML
INJECTION, SOLUTION INTRAMUSCULAR; INTRAVENOUS AS NEEDED
Status: DISCONTINUED | OUTPATIENT
Start: 2025-06-17 | End: 2025-06-17 | Stop reason: SURG

## 2025-06-17 RX ORDER — FENTANYL CITRATE 50 UG/ML
50 INJECTION, SOLUTION INTRAMUSCULAR; INTRAVENOUS
Status: DISCONTINUED | OUTPATIENT
Start: 2025-06-17 | End: 2025-06-17 | Stop reason: HOSPADM

## 2025-06-17 RX ORDER — IPRATROPIUM BROMIDE AND ALBUTEROL SULFATE 2.5; .5 MG/3ML; MG/3ML
3 SOLUTION RESPIRATORY (INHALATION) ONCE AS NEEDED
Status: DISCONTINUED | OUTPATIENT
Start: 2025-06-17 | End: 2025-06-17 | Stop reason: HOSPADM

## 2025-06-17 RX ORDER — BUPIVACAINE HYDROCHLORIDE AND EPINEPHRINE 5; 5 MG/ML; UG/ML
INJECTION, SOLUTION EPIDURAL; INTRACAUDAL; PERINEURAL AS NEEDED
Status: DISCONTINUED | OUTPATIENT
Start: 2025-06-17 | End: 2025-06-17 | Stop reason: HOSPADM

## 2025-06-17 RX ORDER — DEXAMETHASONE SODIUM PHOSPHATE 4 MG/ML
INJECTION, SOLUTION INTRA-ARTICULAR; INTRALESIONAL; INTRAMUSCULAR; INTRAVENOUS; SOFT TISSUE AS NEEDED
Status: DISCONTINUED | OUTPATIENT
Start: 2025-06-17 | End: 2025-06-17 | Stop reason: SURG

## 2025-06-17 RX ORDER — OXYCODONE HYDROCHLORIDE 5 MG/1
10 TABLET ORAL EVERY 4 HOURS PRN
Status: DISCONTINUED | OUTPATIENT
Start: 2025-06-17 | End: 2025-06-17 | Stop reason: HOSPADM

## 2025-06-17 RX ADMIN — SUGAMMADEX 200 MG: 100 INJECTION, SOLUTION INTRAVENOUS at 17:12

## 2025-06-17 RX ADMIN — PROPOFOL 300 MG: 10 INJECTION, EMULSION INTRAVENOUS at 16:31

## 2025-06-17 RX ADMIN — SUGAMMADEX 200 MG: 100 INJECTION, SOLUTION INTRAVENOUS at 17:11

## 2025-06-17 RX ADMIN — SODIUM CHLORIDE 40 ML: 900 INJECTION, SOLUTION INTRAVENOUS at 13:31

## 2025-06-17 RX ADMIN — OXYCODONE 10 MG: 5 TABLET ORAL at 17:55

## 2025-06-17 RX ADMIN — LIDOCAINE HYDROCHLORIDE 100 MG: 20 INJECTION, SOLUTION EPIDURAL; INFILTRATION; INTRACAUDAL; PERINEURAL at 16:31

## 2025-06-17 RX ADMIN — MIDAZOLAM 2 MG: 1 INJECTION INTRAMUSCULAR; INTRAVENOUS at 16:25

## 2025-06-17 RX ADMIN — ONDANSETRON 4 MG: 2 INJECTION, SOLUTION INTRAMUSCULAR; INTRAVENOUS at 16:40

## 2025-06-17 RX ADMIN — FENTANYL CITRATE 100 MCG: 50 INJECTION, SOLUTION INTRAMUSCULAR; INTRAVENOUS at 16:30

## 2025-06-17 RX ADMIN — SODIUM CHLORIDE 3000 MG: 900 INJECTION INTRAVENOUS at 16:22

## 2025-06-17 RX ADMIN — ROCURONIUM BROMIDE 70 MG: 10 INJECTION, SOLUTION INTRAVENOUS at 16:31

## 2025-06-17 RX ADMIN — HYDROMORPHONE HYDROCHLORIDE 0.5 MG: 1 INJECTION, SOLUTION INTRAMUSCULAR; INTRAVENOUS; SUBCUTANEOUS at 16:59

## 2025-06-17 RX ADMIN — DEXAMETHASONE SODIUM PHOSPHATE 8 MG: 4 INJECTION, SOLUTION INTRAMUSCULAR; INTRAVENOUS at 16:40

## 2025-06-17 RX ADMIN — DEXMEDETOMIDINE HYDROCHLORIDE 10 MCG: 400 INJECTION INTRAVENOUS at 16:57

## 2025-06-17 RX ADMIN — SODIUM CHLORIDE, SODIUM LACTATE, POTASSIUM CHLORIDE, AND CALCIUM CHLORIDE: .6; .31; .03; .02 INJECTION, SOLUTION INTRAVENOUS at 16:26

## 2025-06-17 NOTE — ANESTHESIA PREPROCEDURE EVALUATION
Anesthesia Evaluation     Patient summary reviewed and Nursing notes reviewed   NPO Solid Status: > 8 hours  NPO Liquid Status: > 8 hours           Airway   Mallampati: II  TM distance: >3 FB  Neck ROM: full  No difficulty expected  Dental - normal exam     Pulmonary - normal exam    breath sounds clear to auscultation  (+) ,sleep apnea  (-) not a smoker  Cardiovascular - negative cardio ROS and normal exam  Exercise tolerance: unable to assess    ECG reviewed  Rhythm: regular  Rate: normal        Neuro/Psych- negative ROS  GI/Hepatic/Renal/Endo    (+) obesity    Musculoskeletal (-) negative ROS    Abdominal  - normal exam   Substance History - negative use     OB/GYN negative ob/gyn ROS         Other - negative ROS       ROS/Med Hx Other: Chief Complaint  Follow-up (Follow up pilonidal cyst )        Subjective  Patient had a GIPS Pilonidal cystectomy and fistulotomy with seton placement performed on 8/15/2024. Seton was removed on 12/09/2024.   Patient reports swelling and intense pain in the area of his cystectomy site beginning on the 5th of this month. He visited the ER on the 7th where he was diagnosed with a pilonidal abscess and an I&D was performed. Patient was placed on Bactrim, but after culture reports were obtained on the 11th, he was switched to Penicillin VK 500mg. He states the pain has improved since the I&D but that the area continues to have drainage. He denies any fevers, nausea, or vomiting at this time.      Exam  General:  No acute distress  Head: Normocephalic, atraumatic  Neuro: Alert and oriented  Lower Back: There is a large area of erythema and fluctuance in gluteal cleft consistent with pilonidal abscess. Purulent drainage present. Appears some midline pits may have reopened since previous surgery.      Assessment  -Pilonidal abscess     Plan / Recommendations  -I have recommended proceeding with another pilonidal cystectomy, given the recurrence location and lack of resolution after  antibiotic course. Patient agreed. All questions were answered.   -Counseled regarding importance of hygiene and keeping the area dry and clean.  -Will follow-up at time of surgery     Alexandra Chavez PA-C  Colon and Rectal Surgery   [facility]                      Anesthesia Plan    ASA 3     general     (Prone, GETA, Ate 3 cheese-its at 0800.  He is very hungry now )  intravenous induction     Anesthetic plan, risks, benefits, and alternatives have been provided, discussed and informed consent has been obtained with: patient.    CODE STATUS:

## 2025-06-17 NOTE — ANESTHESIA PROCEDURE NOTES
Airway  Reason: elective    Date/Time: 6/17/2025 4:33 PM  Airway not difficult    General Information and Staff    Patient location during procedure: OR  Anesthesiologist: Scott Menjivar MD  CRNA/CAA: Maria Dolores Ocampo CRNA  SRNA: Kim Oliveira SRNA  Indications and Patient Condition  Indications for airway management: airway protection    Preoxygenated: yes  MILS maintained throughout    Mask difficulty assessment: 2 - vent by mask + OA or adjuvant +/- NMBA    Final Airway Details    Final airway type: endotracheal airway      Successful airway: ETT  Cuffed: yes   Successful intubation technique: video laryngoscopy  Adjuncts used in placement: intubating stylet  Endotracheal tube insertion site: oral  Blade: [place]  Blade size: 4  ETT size (mm): 8.0  Cormack-Lehane Classification: grade I - full view of glottis  Placement verified by: chest auscultation and capnometry   Measured from: lips  ETT/EBT  to lips (cm): 24  Number of attempts at approach: 1  Assessment: lips, teeth, and gum same as pre-op and atraumatic intubation

## 2025-06-17 NOTE — DISCHARGE INSTRUCTIONS
"[facility]: [place]   Discharge Instructions: Surgery for Pilonidal Cysts  Follow-Up Appointment: ([phone])  Call the office within a day or so to schedule an appointment.  If a drain is in place, the first appointment is 2-4 days post-surgery for drain removal.  If no drain is used, the first appointment should be approximately three weeks after surgery.  Post-Surgery Rest:  Rest for the remainder of the surgery day.  Avoid sitting on a \"doughnut.\"  Activity:  On the day after surgery, resume normal activities without restrictions.  Walking, straining, and heavy lifting are safe.  Resumption of work is at your discretion.  Car or plane travel is safe.  Diet:  Eat three regular meals a day.  Consume at least three glasses of water daily, in addition to regular beverages.  Pain Management:  Take prescribed pain medication.  Use TWO types of pain medicine:  Non-steroidal anti-inflammatory agent (e.g., ibuprofen) to minimize inflammation and pain.  Narcotic pain medication as prescribed, taken every 3-4 hours as needed.  Non-steroidal medication may reduce the need for narcotics and minimize side effects.  Wound Care (Stitches but No Drain):  Keep the dressing for 24 hours, then it can be removed.  Shower or bathe normally, avoiding vigorous scrubbing.  If drainage persists, use a light dressing.  Wound Care (Stitches and a Drain):  Keep the dressing until seeing the doctor in the office.  Schedule an appointment 2-4 days post-surgery to remove the drain and inspect the incision.  Wound Care (No Stitches - Left Open):  Shower daily, allowing water to directly contact the open incision.  Put a dry dressing over the incision after showering to absorb drainage.  Post-Surgery Expectations:  Possible occurrences that should not cause alarm:  Drainage of bloody discharge (a tablespoonful or less).  Swelling around the anus.  Soreness, burning, itching, or dull aching.  Mild to moderate pain with bowel " movement.  Occasional passage of bright red blood.  Mild fatigue.  Mild fever or odor.  Emergency Situations:  Heavy bleeding is rare. If excessive bleeding is suspected, call the office at [phone]  Note:  If you have any questions or problems, do not hesitate to call the office.

## 2025-06-17 NOTE — H&P
Colorectal Surgery Followup Note    ID:  Freddy Long;   : 1995  DATE OF VISIT: 2025    Chief Complaint  No chief complaint on file.       Subjective  Patient had a GIPS Pilonidal cystectomy and fistulotomy with seton placement performed on 8/15/2024. Seton was removed on 2024.   Patient reports swelling and intense pain in the area of his cystectomy site beginning on the  of this month. He visited the ER on the  where he was diagnosed with a pilonidal abscess and an I&D was performed. Patient was placed on Bactrim, but after culture reports were obtained on the , he was switched to Penicillin VK 500mg. He states the pain has improved since the I&D but that the area continues to have drainage. He denies any fevers, nausea, or vomiting at this time.     Exam  General:  No acute distress  Head: Normocephalic, atraumatic  Neuro: Alert and oriented  Lower Back: There is a large area of erythema and fluctuance in gluteal cleft consistent with pilonidal abscess. Purulent drainage present. Appears some midline pits may have reopened since previous surgery.      Assessment  -Pilonidal abscess     Plan / Recommendations  -I have recommended proceeding with another pilonidal cystectomy, given the recurrence location and lack of resolution after antibiotic course. Patient agreed. All questions were answered.   -Counseled regarding importance of hygiene and keeping the area dry and clean.  -Will follow-up at time of surgery    Alexandra Chavez PA-C  Colon and Rectal Surgery   [facility]

## 2025-06-17 NOTE — ANESTHESIA POSTPROCEDURE EVALUATION
Patient: Freddy Long    Procedure Summary       Date: 06/17/25 Room / Location: [facility] OR 09 / [facility] MAIN OR    Anesthesia Start: 1626 Anesthesia Stop: 1723    Procedure: PILONIDAL CYSTECTOMY with acell application (Back) Diagnosis:       Pilonidal abscess      (Pilonidal abscess [L05.01])    Surgeons: Renan Walters MD Provider: Scott Menjivar MD    Anesthesia Type: general ASA Status: 3            Anesthesia Type: general    Vitals  Vitals Value Taken Time   /83 06/17/25 18:05   Temp 97.8 °F (36.6 °C) 06/17/25 18:05   Pulse 77 06/17/25 18:06   Resp 13 06/17/25 18:05   SpO2 94 % 06/17/25 18:06   Vitals shown include unfiled device data.        Post Anesthesia Care and Evaluation    Patient location during evaluation: PACU  Patient participation: complete - patient participated  Level of consciousness: awake  Pain scale: See nurse's notes for pain score.  Pain management: adequate    Airway patency: patent  Anesthetic complications: No anesthetic complications  PONV Status: none  Cardiovascular status: acceptable  Respiratory status: acceptable and spontaneous ventilation  Hydration status: acceptable    Comments: Patient seen and examined postoperatively; vital signs stable; SpO2 greater than or equal to 90%; cardiopulmonary status stable; nausea/vomiting adequately controlled; pain adequately controlled; no apparent anesthesia complications; patient discharged from anesthesia care when discharge criteria were met

## 2025-06-22 NOTE — OP NOTE
[facility] Operative Note   Name: Freddy Long  : 1995    Date of Surgery: 2025  Pre-op Diagnosis: Pilonidal cyst  Post-op Diagnosis: same  Procedure:   Minimal invasive pilonidal cystectomy   Acell product application   Surgeon: Renan Walters M.D.  Assistants: N/A.  Anesthesia: Local/MAC  IV Fluids: refer to anesthesia record  Estimated Blood Loss: minimal  Drains: none  Implants: none  Specimen:   Findings     1. Single midline pit with large 3 cm pilonidal cyst. Previous midline pits have completely healed and closed     Operative Procedure     After appropriate consent including risks, benefits and alternatives, the patient was brought into the operating suite, and anesthesia administered.   The patient was placed in the prone position, and all tiarra prominences were padded. The patient was then prepped and draped in the usual sterile fashion.     The pilonidal cyst was identified and noted to be approximately 3 cm in diameter. There was 1 midline pits at the base of the gluteal cleft that tracked to the cyst.    The pit was excised and the cavity was curreted. Hydrogen peroxide was instilled and no additional sinus was identified.     Acell product was instilled in the cavity and the midline pit was closed over the product using interrupted 3-0 chromic.   Dressings were then applied and the case was concluded.    The patient tolerated the procedure well and was taken to the recovery room in stable fashion.      Counts: Instrument, sponge, and needle counts were reported to be correct prior to closure and at the conclusion of the case.    Disposition  The patient was taken to Recovery Room in good condition.    Complications   No complications

## 2025-07-11 ENCOUNTER — OFFICE VISIT (OUTPATIENT)
Age: 30
End: 2025-07-11
Payer: COMMERCIAL

## 2025-07-11 VITALS
BODY MASS INDEX: 36.96 KG/M2 | HEIGHT: 77 IN | DIASTOLIC BLOOD PRESSURE: 88 MMHG | SYSTOLIC BLOOD PRESSURE: 119 MMHG | HEART RATE: 73 BPM | OXYGEN SATURATION: 97 % | TEMPERATURE: 98.4 F | WEIGHT: 313 LBS

## 2025-07-11 DIAGNOSIS — L05.01 PILONIDAL ABSCESS: Primary | ICD-10-CM

## 2025-07-11 PROCEDURE — 99024 POSTOP FOLLOW-UP VISIT: CPT | Performed by: STUDENT IN AN ORGANIZED HEALTH CARE EDUCATION/TRAINING PROGRAM

## 2025-07-12 NOTE — PROGRESS NOTES
Colorectal Surgery Followup Note    ID:  Freddy Long;   : 1995  DATE OF VISIT: 2025    Chief Complaint  Post-op (PO PILONIDAL CYSTECTOMY with acell application 2025)       Subjective    Freddy is status post pilonidal cystectomy.  He is doing well.  He denies any discharge or pain.  Denies any fevers or chills  Exam  General:  No acute distress  Head: Normocephalic, atraumatic  Neuro: Alert and oriented    Lower back: Well-healed pilonidal cystectomy site          Assessment  - Status post pilonidal cystectomy    Plan / Recommendations  - He is doing well, wound nearly completely closed  - Follow-up in 4 weeks       Renan Walters MD  Colon and Rectal Surgery   [facility]

## 2025-08-08 ENCOUNTER — OFFICE VISIT (OUTPATIENT)
Age: 30
End: 2025-08-08
Payer: COMMERCIAL

## 2025-08-08 VITALS
SYSTOLIC BLOOD PRESSURE: 128 MMHG | TEMPERATURE: 98.2 F | HEART RATE: 73 BPM | HEIGHT: 77 IN | BODY MASS INDEX: 37.19 KG/M2 | WEIGHT: 315 LBS | DIASTOLIC BLOOD PRESSURE: 89 MMHG | OXYGEN SATURATION: 97 %

## 2025-08-08 DIAGNOSIS — L05.01 PILONIDAL ABSCESS: Primary | ICD-10-CM

## 2025-08-08 PROCEDURE — 99024 POSTOP FOLLOW-UP VISIT: CPT | Performed by: STUDENT IN AN ORGANIZED HEALTH CARE EDUCATION/TRAINING PROGRAM

## (undated) DEVICE — CATH IV INSYTE AUTOGARD BLD/CONTRL SHLD 18G 1.25IN GRN

## (undated) DEVICE — SYR LUERLOK 30CC

## (undated) DEVICE — SYR LL TP 10ML STRL

## (undated) DEVICE — PUNCH BIOP 5MM STRL

## (undated) DEVICE — GLOVE,SURG,SENSICARE SLT,LF,PF,6.5: Brand: MEDLINE

## (undated) DEVICE — ERBE NESSY®PLATE 170 SPLIT; 168CM²; CABLE 3M: Brand: ERBE

## (undated) DEVICE — PK MINOR LAPAROTOMY 50

## (undated) DEVICE — PANT KNIT MATERN L/XL 2BG

## (undated) DEVICE — TP SKIN FABRC WATERPRF 2IN 5YD

## (undated) DEVICE — SYR LUERLOK 20CC BX/50

## (undated) DEVICE — GLV SURG BIOGEL LTX PF 7

## (undated) DEVICE — SOL HYDROGEN PEROX 3PCT 8OZ

## (undated) DEVICE — GAUZE,SPONGE,FLUFF,6"X6.75",STRL,5/TRAY: Brand: MEDLINE

## (undated) DEVICE — PENCL SMOKE/EVAC MEGADYNE TELESCP 15FT

## (undated) DEVICE — SOL IRR NACL 0.9PCT 1000ML

## (undated) DEVICE — NDL HYPO PRECISIONGLIDE REG 25G 1 1/2

## (undated) DEVICE — GAUZE,PACKING STRIP,IODOFORM,1/2"X5YD,ST: Brand: CURAD

## (undated) DEVICE — PAD,ABDOMINAL,5"X9",STERILE,LF,1/PK: Brand: MEDLINE INDUSTRIES, INC.

## (undated) DEVICE — UNDRGLV SURG BIOGEL PIMICROINDICATOR SYNTH SZ7.5PF STRL PR/2

## (undated) DEVICE — PAD,ABDOMINAL,8"X10",ST,LF: Brand: MEDLINE

## (undated) DEVICE — CUFF SCD HEMOFORCE SEQ CALF STD MD

## (undated) DEVICE — KT SURG TURNOVER 050

## (undated) DEVICE — WET SKIN PREP TRAY: Brand: MEDLINE INDUSTRIES, INC.

## (undated) DEVICE — VESSEL LOOPS X-RAY DETECTABLE: Brand: DEROYAL

## (undated) DEVICE — SOLUTION,WATER,IRRIGATION,1000ML,STERILE: Brand: MEDLINE

## (undated) DEVICE — HDRST INTUB GENTLETOUCH SLOT 7IN RT

## (undated) DEVICE — 3M™ DURAPORE™ SURGICAL TAPE 1538-3, 3 INCH X 10 YARD (7,5CM X 9,1M), 4 ROLLS/BOX: Brand: 3M™ DURAPORE™

## (undated) DEVICE — SLV SCD CALF HEMOFORCE DVT THERP REPROC MD

## (undated) DEVICE — SUT SILK 0 TIES 30IN A306H